# Patient Record
Sex: FEMALE | Race: WHITE | Employment: OTHER | URBAN - METROPOLITAN AREA
[De-identification: names, ages, dates, MRNs, and addresses within clinical notes are randomized per-mention and may not be internally consistent; named-entity substitution may affect disease eponyms.]

---

## 2022-12-05 ENCOUNTER — APPOINTMENT (OUTPATIENT)
Dept: GENERAL RADIOLOGY | Age: 87
DRG: 572 | End: 2022-12-05
Payer: MEDICARE

## 2022-12-05 PROCEDURE — 99285 EMERGENCY DEPT VISIT HI MDM: CPT

## 2022-12-05 PROCEDURE — 73110 X-RAY EXAM OF WRIST: CPT

## 2022-12-06 ENCOUNTER — ANESTHESIA EVENT (OUTPATIENT)
Dept: OPERATING ROOM | Age: 87
End: 2022-12-06
Payer: MEDICARE

## 2022-12-06 ENCOUNTER — HOSPITAL ENCOUNTER (INPATIENT)
Age: 87
LOS: 5 days | Discharge: HOME HEALTH | DRG: 572 | End: 2022-12-11
Attending: EMERGENCY MEDICINE | Admitting: INTERNAL MEDICINE
Payer: MEDICARE

## 2022-12-06 ENCOUNTER — ANESTHESIA (OUTPATIENT)
Dept: OPERATING ROOM | Age: 87
End: 2022-12-06
Payer: MEDICARE

## 2022-12-06 DIAGNOSIS — L02.419 ABSCESS OF WRIST: ICD-10-CM

## 2022-12-06 DIAGNOSIS — L02.91 ABSCESS: Primary | ICD-10-CM

## 2022-12-06 LAB
ALBUMIN SERPL-MCNC: 4 G/DL (ref 3.5–5.2)
ALP BLD-CCNC: 125 U/L (ref 35–104)
ALT SERPL-CCNC: 10 U/L (ref 0–32)
ANION GAP SERPL CALCULATED.3IONS-SCNC: 8 MMOL/L (ref 7–16)
ANTISTREPTOLYSIN-O: 32 IU/ML (ref 0–200)
AST SERPL-CCNC: 17 U/L (ref 0–31)
BASOPHILS ABSOLUTE: 0.06 E9/L (ref 0–0.2)
BASOPHILS RELATIVE PERCENT: 0.6 % (ref 0–2)
BILIRUB SERPL-MCNC: 0.4 MG/DL (ref 0–1.2)
BUN BLDV-MCNC: 18 MG/DL (ref 6–23)
C-REACTIVE PROTEIN: 0.3 MG/DL (ref 0–0.4)
CALCIUM SERPL-MCNC: 9.3 MG/DL (ref 8.6–10.2)
CHLORIDE BLD-SCNC: 101 MMOL/L (ref 98–107)
CO2: 29 MMOL/L (ref 22–29)
CREAT SERPL-MCNC: 0.8 MG/DL (ref 0.5–1)
EOSINOPHILS ABSOLUTE: 0.24 E9/L (ref 0.05–0.5)
EOSINOPHILS RELATIVE PERCENT: 2.6 % (ref 0–6)
GFR SERPL CREATININE-BSD FRML MDRD: >60 ML/MIN/1.73
GLUCOSE BLD-MCNC: 106 MG/DL (ref 74–99)
HCT VFR BLD CALC: 48.4 % (ref 34–48)
HEMOGLOBIN: 15.4 G/DL (ref 11.5–15.5)
IMMATURE GRANULOCYTES #: 0.02 E9/L
IMMATURE GRANULOCYTES %: 0.2 % (ref 0–5)
LACTIC ACID, SEPSIS: 1.2 MMOL/L (ref 0.5–1.9)
LACTIC ACID, SEPSIS: 1.5 MMOL/L (ref 0.5–1.9)
LYMPHOCYTES ABSOLUTE: 4.47 E9/L (ref 1.5–4)
LYMPHOCYTES RELATIVE PERCENT: 47.9 % (ref 20–42)
MCH RBC QN AUTO: 28.7 PG (ref 26–35)
MCHC RBC AUTO-ENTMCNC: 31.8 % (ref 32–34.5)
MCV RBC AUTO: 90.3 FL (ref 80–99.9)
MONOCYTES ABSOLUTE: 0.67 E9/L (ref 0.1–0.95)
MONOCYTES RELATIVE PERCENT: 7.2 % (ref 2–12)
NEUTROPHILS ABSOLUTE: 3.87 E9/L (ref 1.8–7.3)
NEUTROPHILS RELATIVE PERCENT: 41.5 % (ref 43–80)
PDW BLD-RTO: 14 FL (ref 11.5–15)
PLATELET # BLD: 246 E9/L (ref 130–450)
PMV BLD AUTO: 10.9 FL (ref 7–12)
POTASSIUM REFLEX MAGNESIUM: 4.1 MMOL/L (ref 3.5–5)
RBC # BLD: 5.36 E12/L (ref 3.5–5.5)
SEDIMENTATION RATE, ERYTHROCYTE: 2 MM/HR (ref 0–20)
SODIUM BLD-SCNC: 138 MMOL/L (ref 132–146)
TOTAL PROTEIN: 6.9 G/DL (ref 6.4–8.3)
WBC # BLD: 9.3 E9/L (ref 4.5–11.5)

## 2022-12-06 PROCEDURE — 83605 ASSAY OF LACTIC ACID: CPT

## 2022-12-06 PROCEDURE — 87075 CULTR BACTERIA EXCEPT BLOOD: CPT

## 2022-12-06 PROCEDURE — 2709999900 HC NON-CHARGEABLE SUPPLY: Performed by: ORTHOPAEDIC SURGERY

## 2022-12-06 PROCEDURE — 0JBG0ZZ EXCISION OF RIGHT LOWER ARM SUBCUTANEOUS TISSUE AND FASCIA, OPEN APPROACH: ICD-10-PCS | Performed by: ORTHOPAEDIC SURGERY

## 2022-12-06 PROCEDURE — 87070 CULTURE OTHR SPECIMN AEROBIC: CPT

## 2022-12-06 PROCEDURE — 7100000001 HC PACU RECOVERY - ADDTL 15 MIN: Performed by: ORTHOPAEDIC SURGERY

## 2022-12-06 PROCEDURE — 3700000000 HC ANESTHESIA ATTENDED CARE: Performed by: ORTHOPAEDIC SURGERY

## 2022-12-06 PROCEDURE — 86140 C-REACTIVE PROTEIN: CPT

## 2022-12-06 PROCEDURE — 3600000012 HC SURGERY LEVEL 2 ADDTL 15MIN: Performed by: ORTHOPAEDIC SURGERY

## 2022-12-06 PROCEDURE — 2580000003 HC RX 258: Performed by: ORTHOPAEDIC SURGERY

## 2022-12-06 PROCEDURE — 87205 SMEAR GRAM STAIN: CPT

## 2022-12-06 PROCEDURE — 2580000003 HC RX 258: Performed by: NURSE ANESTHETIST, CERTIFIED REGISTERED

## 2022-12-06 PROCEDURE — 6370000000 HC RX 637 (ALT 250 FOR IP): Performed by: ORTHOPAEDIC SURGERY

## 2022-12-06 PROCEDURE — 2580000003 HC RX 258: Performed by: SPECIALIST

## 2022-12-06 PROCEDURE — 1200000000 HC SEMI PRIVATE

## 2022-12-06 PROCEDURE — 3600000002 HC SURGERY LEVEL 2 BASE: Performed by: ORTHOPAEDIC SURGERY

## 2022-12-06 PROCEDURE — 6360000002 HC RX W HCPCS: Performed by: NURSE ANESTHETIST, CERTIFIED REGISTERED

## 2022-12-06 PROCEDURE — 2500000003 HC RX 250 WO HCPCS: Performed by: NURSE ANESTHETIST, CERTIFIED REGISTERED

## 2022-12-06 PROCEDURE — 3700000001 HC ADD 15 MINUTES (ANESTHESIA): Performed by: ORTHOPAEDIC SURGERY

## 2022-12-06 PROCEDURE — 85025 COMPLETE CBC W/AUTO DIFF WBC: CPT

## 2022-12-06 PROCEDURE — 80053 COMPREHEN METABOLIC PANEL: CPT

## 2022-12-06 PROCEDURE — 88304 TISSUE EXAM BY PATHOLOGIST: CPT

## 2022-12-06 PROCEDURE — 7100000000 HC PACU RECOVERY - FIRST 15 MIN: Performed by: ORTHOPAEDIC SURGERY

## 2022-12-06 PROCEDURE — 6360000002 HC RX W HCPCS: Performed by: SPECIALIST

## 2022-12-06 PROCEDURE — 86060 ANTISTREPTOLYSIN O TITER: CPT

## 2022-12-06 PROCEDURE — 87040 BLOOD CULTURE FOR BACTERIA: CPT

## 2022-12-06 PROCEDURE — 36415 COLL VENOUS BLD VENIPUNCTURE: CPT

## 2022-12-06 PROCEDURE — 99222 1ST HOSP IP/OBS MODERATE 55: CPT | Performed by: INTERNAL MEDICINE

## 2022-12-06 PROCEDURE — 85651 RBC SED RATE NONAUTOMATED: CPT

## 2022-12-06 RX ORDER — SODIUM CHLORIDE 9 MG/ML
INJECTION, SOLUTION INTRAVENOUS CONTINUOUS PRN
Status: DISCONTINUED | OUTPATIENT
Start: 2022-12-06 | End: 2022-12-06 | Stop reason: SDUPTHER

## 2022-12-06 RX ORDER — ONDANSETRON 2 MG/ML
INJECTION INTRAMUSCULAR; INTRAVENOUS PRN
Status: DISCONTINUED | OUTPATIENT
Start: 2022-12-06 | End: 2022-12-06 | Stop reason: SDUPTHER

## 2022-12-06 RX ORDER — SODIUM CHLORIDE 9 MG/ML
INJECTION, SOLUTION INTRAVENOUS PRN
Status: DISCONTINUED | OUTPATIENT
Start: 2022-12-06 | End: 2022-12-11 | Stop reason: HOSPADM

## 2022-12-06 RX ORDER — WATER 1000 ML/1000ML
INJECTION, SOLUTION INTRAVENOUS
Status: DISPENSED
Start: 2022-12-06 | End: 2022-12-07

## 2022-12-06 RX ORDER — HYDROCODONE BITARTRATE AND ACETAMINOPHEN 7.5; 325 MG/1; MG/1
1 TABLET ORAL EVERY 4 HOURS PRN
Status: DISCONTINUED | OUTPATIENT
Start: 2022-12-06 | End: 2022-12-08 | Stop reason: ALTCHOICE

## 2022-12-06 RX ORDER — FENTANYL CITRATE 50 UG/ML
INJECTION, SOLUTION INTRAMUSCULAR; INTRAVENOUS PRN
Status: DISCONTINUED | OUTPATIENT
Start: 2022-12-06 | End: 2022-12-06 | Stop reason: SDUPTHER

## 2022-12-06 RX ORDER — LIDOCAINE HYDROCHLORIDE 20 MG/ML
INJECTION, SOLUTION EPIDURAL; INFILTRATION; INTRACAUDAL; PERINEURAL PRN
Status: DISCONTINUED | OUTPATIENT
Start: 2022-12-06 | End: 2022-12-06 | Stop reason: SDUPTHER

## 2022-12-06 RX ORDER — LEVOTHYROXINE SODIUM 0.05 MG/1
50 TABLET ORAL DAILY
COMMUNITY

## 2022-12-06 RX ORDER — PROPOFOL 10 MG/ML
INJECTION, EMULSION INTRAVENOUS PRN
Status: DISCONTINUED | OUTPATIENT
Start: 2022-12-06 | End: 2022-12-06 | Stop reason: SDUPTHER

## 2022-12-06 RX ORDER — FENTANYL CITRATE 50 UG/ML
25 INJECTION, SOLUTION INTRAMUSCULAR; INTRAVENOUS EVERY 5 MIN PRN
Status: DISCONTINUED | OUTPATIENT
Start: 2022-12-06 | End: 2022-12-06 | Stop reason: HOSPADM

## 2022-12-06 RX ORDER — SODIUM CHLORIDE 0.9 % (FLUSH) 0.9 %
5-40 SYRINGE (ML) INJECTION PRN
Status: DISCONTINUED | OUTPATIENT
Start: 2022-12-06 | End: 2022-12-06 | Stop reason: HOSPADM

## 2022-12-06 RX ORDER — ACETAMINOPHEN 325 MG/1
650 TABLET ORAL EVERY 6 HOURS
Status: DISCONTINUED | OUTPATIENT
Start: 2022-12-06 | End: 2022-12-11 | Stop reason: HOSPADM

## 2022-12-06 RX ORDER — SODIUM CHLORIDE 0.9 % (FLUSH) 0.9 %
5-40 SYRINGE (ML) INJECTION EVERY 12 HOURS SCHEDULED
Status: DISCONTINUED | OUTPATIENT
Start: 2022-12-06 | End: 2022-12-11 | Stop reason: HOSPADM

## 2022-12-06 RX ORDER — EPHEDRINE SULFATE/0.9% NACL/PF 50 MG/5 ML
SYRINGE (ML) INTRAVENOUS PRN
Status: DISCONTINUED | OUTPATIENT
Start: 2022-12-06 | End: 2022-12-06 | Stop reason: SDUPTHER

## 2022-12-06 RX ORDER — OXYCODONE HYDROCHLORIDE 5 MG/1
5 TABLET ORAL EVERY 4 HOURS PRN
Status: DISCONTINUED | OUTPATIENT
Start: 2022-12-06 | End: 2022-12-11 | Stop reason: HOSPADM

## 2022-12-06 RX ORDER — SODIUM CHLORIDE 9 MG/ML
INJECTION, SOLUTION INTRAVENOUS PRN
Status: DISCONTINUED | OUTPATIENT
Start: 2022-12-06 | End: 2022-12-06 | Stop reason: HOSPADM

## 2022-12-06 RX ORDER — SODIUM CHLORIDE 0.9 % (FLUSH) 0.9 %
5-40 SYRINGE (ML) INJECTION PRN
Status: DISCONTINUED | OUTPATIENT
Start: 2022-12-06 | End: 2022-12-11 | Stop reason: HOSPADM

## 2022-12-06 RX ORDER — SODIUM CHLORIDE 0.9 % (FLUSH) 0.9 %
5-40 SYRINGE (ML) INJECTION EVERY 12 HOURS SCHEDULED
Status: DISCONTINUED | OUTPATIENT
Start: 2022-12-06 | End: 2022-12-06 | Stop reason: HOSPADM

## 2022-12-06 RX ORDER — FENTANYL CITRATE 50 UG/ML
50 INJECTION, SOLUTION INTRAMUSCULAR; INTRAVENOUS EVERY 5 MIN PRN
Status: DISCONTINUED | OUTPATIENT
Start: 2022-12-06 | End: 2022-12-06 | Stop reason: HOSPADM

## 2022-12-06 RX ORDER — SODIUM CHLORIDE 9 MG/ML
INJECTION, SOLUTION INTRAVENOUS CONTINUOUS
Status: DISCONTINUED | OUTPATIENT
Start: 2022-12-06 | End: 2022-12-11 | Stop reason: HOSPADM

## 2022-12-06 RX ORDER — CEFAZOLIN 2 G/1
INJECTION, POWDER, FOR SOLUTION INTRAMUSCULAR; INTRAVENOUS
Status: DISPENSED
Start: 2022-12-06 | End: 2022-12-07

## 2022-12-06 RX ADMIN — LIDOCAINE HYDROCHLORIDE 100 MG: 20 INJECTION, SOLUTION EPIDURAL; INFILTRATION; INTRACAUDAL; PERINEURAL at 16:45

## 2022-12-06 RX ADMIN — FENTANYL CITRATE 50 MCG: 50 INJECTION, SOLUTION INTRAMUSCULAR; INTRAVENOUS at 16:45

## 2022-12-06 RX ADMIN — SODIUM CHLORIDE: 9 INJECTION, SOLUTION INTRAVENOUS at 16:20

## 2022-12-06 RX ADMIN — FENTANYL CITRATE 50 MCG: 50 INJECTION, SOLUTION INTRAMUSCULAR; INTRAVENOUS at 17:04

## 2022-12-06 RX ADMIN — OXYCODONE 5 MG: 5 TABLET ORAL at 18:35

## 2022-12-06 RX ADMIN — ACETAMINOPHEN 650 MG: 325 TABLET ORAL at 18:35

## 2022-12-06 RX ADMIN — WATER 2000 MG: 1 INJECTION INTRAMUSCULAR; INTRAVENOUS; SUBCUTANEOUS at 17:02

## 2022-12-06 RX ADMIN — SODIUM CHLORIDE: 9 INJECTION, SOLUTION INTRAVENOUS at 18:38

## 2022-12-06 RX ADMIN — PROPOFOL 100 MG: 10 INJECTION, EMULSION INTRAVENOUS at 16:45

## 2022-12-06 RX ADMIN — ONDANSETRON 4 MG: 2 INJECTION INTRAMUSCULAR; INTRAVENOUS at 16:55

## 2022-12-06 RX ADMIN — Medication 10 MG: at 16:58

## 2022-12-06 ASSESSMENT — ENCOUNTER SYMPTOMS
SHORTNESS OF BREATH: 0
VOMITING: 0
EYE REDNESS: 0
NAUSEA: 0
ABDOMINAL PAIN: 0

## 2022-12-06 ASSESSMENT — PAIN - FUNCTIONAL ASSESSMENT
PAIN_FUNCTIONAL_ASSESSMENT: NONE - DENIES PAIN
PAIN_FUNCTIONAL_ASSESSMENT: PREVENTS OR INTERFERES SOME ACTIVE ACTIVITIES AND ADLS

## 2022-12-06 ASSESSMENT — PAIN SCALES - GENERAL
PAINLEVEL_OUTOF10: 0
PAINLEVEL_OUTOF10: 8

## 2022-12-06 ASSESSMENT — PAIN DESCRIPTION - ORIENTATION
ORIENTATION: RIGHT
ORIENTATION: RIGHT

## 2022-12-06 ASSESSMENT — PAIN DESCRIPTION - LOCATION
LOCATION: HAND
LOCATION: WRIST

## 2022-12-06 ASSESSMENT — PAIN DESCRIPTION - DESCRIPTORS: DESCRIPTORS: ACHING;DISCOMFORT

## 2022-12-06 ASSESSMENT — PAIN DESCRIPTION - PAIN TYPE: TYPE: SURGICAL PAIN

## 2022-12-06 NOTE — PROGRESS NOTES
S/p right forearm I&D  Likely infected  Sent for cultures and pathology  Abx per ID  Follow up with ortho in 2 weeks  Stay in splint  Nwb right arm

## 2022-12-06 NOTE — CARE COORDINATION
Introduced my self and provided explanation of CM role to patient. Patient is awake, alert, and aware of current diagnosis and treatment plan including OR today for a right wrist abscess incision and drainage. ID is also on consult. She voices she resides at home alone and completes her adl's with independence. Patient is established with a pcp and denies any issue with retail pharmaceutical coverage. She has 2 sons locally. She indicates that if she is discharged on oral antibiotic therapy she would have no discharge planning needs. She is currently on no anti-infective therapy. Will follow along with  and assist with discharge planning as necessary. Aneesh Weller.  Aamir, MSN RN  [facility] Case Management  [phone]

## 2022-12-06 NOTE — H&P
[facility] History and Physical        Chief Complaint:  wrist pain  History of Present Illness   The patient is a 80 y.o. female    Pcp from [place] [address]? Hx of recurrent issues with R wrist/ganglion? Sent in by Dr. Monica Snellen yesterday seen in office --for surgery today. cyst present on the dorsum of her right wrist for the last 2 months. Over the last 2 weeks is became erythematous, moderately painful and is now draining. The pain had somewhat improved since has been draining. Then in ER/ortho- compression wrapping placed on wrist which has been helpful    She denies taking abx at home  She denies fevers ++ chills (only since been in ER, feels cold and wants blanket  No abx given in ER  Wbc N< LA N  Xray no osteo seen    - hx taken from the patient  REVIEW OF SYSTEMS:  no fevers, chills, cp, sob, n/v, ha, vision/hearing changes, wt changes, hot/cold flashes, other open skin lesions, diarrhea, constipation, dysuria/hematuria unless noted in HPI. Complete ROS performed with the patient and is otherwise negative. Past Medical History:  +Disease of thyroid gland   Negative for Asthma, lung disease, cancer, pulmonary embolism, diabetes, other cardiac disease, hepatic disease , allergies , renal disease, CHF  Past Surgical History:    Not relevant to admission    Home Medications:  Prior to Admission medications    Not on File       Allergies:  Keflex [cephalexin]    Social History:   TOBACCO:  never smoker  ETOH:   has no history on file for alcohol use. Family History:   No relevant family hx - applicable to this admission  Or deferred/otherwise considered non contributory to current admission  PHYSICAL EXAM:    VS: BP (!) 164/86   Pulse 56   Temp 97.5 °F (36.4 °C) (Oral)   Resp 16   Ht 5' 4\" (1.626 m)   Wt 122 lb 8 oz (55.6 kg)   SpO2 96%   BMI 21.03 kg/m²     General Appearance:     no acute distress. Psych:  HEENT:    A.O.  As per HPI details  NC/AT, PERRL, no pallor no icterus, lips/ext mucous membrane grossly N    Neck:   Supple, trachea midline, no obvious JVD   Resp:     Dec BS No wheezes, No rhonchi   Chest wall:    No tenderness or deformity   Heart:    Regular rate and rhythm, S1 and S2 normal, no rub or gallop. Abdomen:     Soft, non-tender, bowel sounds active    no suspicious obvious masses/organomegaly   Genitalia & Rectal:    Deferred.    Extremities x4:   Extremities normal, atraumatic, no cyanosis, no clubbing   Musculoskeletal:      NO active synovitis or swollen b/l wrists, 2-5 MCPs examined   Skin:     Per ER prior to compression dressing placed:  \"3 cm diameter area of fluctuance draining on the dorsum of the patient's right wrist\" no major surrounding erythema   Other wise Skin color, texture, turgor fairly normal,   Lymph nodes:   Cervical nodes grossly normal   Neurologic:  .Grossly symmetric  strength in UEs and LEs with symmetric grossly intact to light touch sensation     LABS:  CBC:   Lab Results   Component Value Date/Time    WBC 9.3 12/06/2022 02:13 AM    RBC 5.36 12/06/2022 02:13 AM    HGB 15.4 12/06/2022 02:13 AM    HCT 48.4 12/06/2022 02:13 AM     12/06/2022 02:13 AM    MCV 90.3 12/06/2022 02:13 AM     BMP:    Lab Results   Component Value Date/Time     12/06/2022 02:13 AM    K 4.1 12/06/2022 02:13 AM     12/06/2022 02:13 AM    CO2 29 12/06/2022 02:13 AM    BUN 18 12/06/2022 02:13 AM    CREATININE 0.8 12/06/2022 02:13 AM    GLUCOSE 106 12/06/2022 02:13 AM    CALCIUM 9.3 12/06/2022 02:13 AM     Hepatic Function Panel:    Lab Results   Component Value Date/Time    ALKPHOS 125 12/06/2022 02:13 AM    AST 17 12/06/2022 02:13 AM    ALT 10 12/06/2022 02:13 AM    PROT 6.9 12/06/2022 02:13 AM    LABALBU 4.0 12/06/2022 02:13 AM    BILITOT 0.4 12/06/2022 02:13 AM     Magnesium:  No results found for: MG    PT/INR:  No results found for: PROTIME, INR  U/A: No results found for: NITRITE, [address] [facility], [address] [facility], [address], [address] [place] [address], Kaya Resendez  ABG:  No results found for: PHART, IUJ8VTB, PO2ART, T7UBJUXG, TSV8WRE, BEART  TSH:  No results found for: TSH  Cardiac Enzymes: No results found for: CKTOTAL, CKMB, CKMBINDEX, TROPONINI    Radiology  No acute fracture or subluxation is identified. No definite evidence of   osseous destruction or periosteal reaction. 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 No focal soft tissue   abnormality.     :     Assessment & Plan   ACTIVE hospital problems being addressed/reassessed for this admission:  Principal Problem:    Abscess of wrist  Resolved Problems:    * No resolved hospital problems. *    Code status/DVT prophylaxis and PLAN --see orders   Note extensive time spent coordinating care between ER docs, ER and floor nurses, and transitioning care over to day providers  Plan of care/ clinical impressions/communication specifics detailed below:    80 y.o. female    Pcp from [facility]? Hx of recurrent issues with R wrist/ganglion? Sent in by Dr. Barnett First yesterday seen in office --for surgery today. cyst present on the dorsum of her right wrist for the last 2 months. Over the last 2 weeks is became erythematous, moderately painful and is now draining. The pain had somewhat improved since has been draining. Then in ER/ortho- compression wrapping placed on wrist which has been helpful    She denies taking abx at home  She denies fevers ++ chills (only since been in ER, feels cold and wants blanket  No abx given in ER  Wbc N< LA N  Xray no osteo seen  No acute fracture or subluxation is identified. No definite evidence of   osseous destruction or periosteal reaction. 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 No focal soft tissue   abnormality.          Tay Acuña MD   Night Officer, overnight admitting doctor at [facility] call day time doctor   for questions after 7:30am    Covering for [address]  Hospitalist Service  If Qs please call [phone]  Electronically signed by Rai Abad MD on 12/6/2022 at 4:37 AM  '

## 2022-12-06 NOTE — ANESTHESIA PRE PROCEDURE
Department of Anesthesiology  Preprocedure Note       Name:  Alek Smoker   Age:  80 y.o.  :  1929                                          MRN:  [id number]         Date:  2022      Surgeon: Enzo Lopez):  Chan Parisi MD    Procedure: Procedure(s):  I & D OF ABSCESS RIGHT WRIST    ++REQ TO FOLLOW++    Medications prior to admission:   Prior to Admission medications    Medication Sig Start Date End Date Taking? Authorizing Provider   levothyroxine (SYNTHROID) 50 MCG tablet Take 50 mcg by mouth Daily   Yes Historical Provider, MD       Current medications:    Current Facility-Administered Medications   Medication Dose Route Frequency Provider Last Rate Last Admin    HYDROcodone-acetaminophen (NORCO) 7.5-325 MG per tablet 1 tablet  1 tablet Oral Q4H PRN Chan Parisi MD        ceFAZolin (ANCEF) 2,000 mg in sterile water 20 mL IV syringe  2,000 mg IntraVENous Q12H Luca Auguste MD        ceFAZolin (ANCEF) 2 g injection             sterile water injection              Facility-Administered Medications Ordered in Other Encounters   Medication Dose Route Frequency Provider Last Rate Last Admin    0.9 % sodium chloride infusion   IntraVENous Continuous PRN Charlotte Lung, APRN - CRNA   New Bag at 22 1620       Allergies: Allergies   Allergen Reactions    Keflex [Cephalexin]        Problem List:    Patient Active Problem List   Diagnosis Code    Abscess of wrist L02.419       Past Medical History:  History reviewed. No pertinent past medical history. Past Surgical History:  No past surgical history on file.     Social History:    Social History     Tobacco Use    Smoking status: Never     Passive exposure: Never    Smokeless tobacco: Never   Substance Use Topics    Alcohol use: Not on file                                Counseling given: Not Answered      Vital Signs (Current):   Vitals:    22 0354 22 0415 22 0722 22 1603   BP: (!) 171/82 (!) 164/86 (!) 121/58 (!) 159/93   Pulse: 76 56 64 59   Resp: 18 16 16 (!) 8   Temp: 98.6 °F (37 °C) 97.5 °F (36.4 °C) 98 °F (36.7 °C) 98.3 °F (36.8 °C)   TempSrc:  Oral Oral    SpO2: 97% 96% 93% 95%   Weight:  122 lb 8 oz (55.6 kg)  122 lb (55.3 kg)   Height:  5' 4\" (1.626 m)  5' 4\" (1.626 m)                                              BP Readings from Last 3 Encounters:   12/06/22 (!) 159/93       NPO Status: Time of last liquid consumption: 2200                        Time of last solid consumption: 1700                        Date of last liquid consumption: 12/05/22                        Date of last solid food consumption: 12/05/22    BMI:   Wt Readings from Last 3 Encounters:   12/06/22 122 lb (55.3 kg)     Body mass index is 20.94 kg/m². CBC:   Lab Results   Component Value Date/Time    WBC 9.3 12/06/2022 02:13 AM    RBC 5.36 12/06/2022 02:13 AM    HGB 15.4 12/06/2022 02:13 AM    HCT 48.4 12/06/2022 02:13 AM    MCV 90.3 12/06/2022 02:13 AM    RDW 14.0 12/06/2022 02:13 AM     12/06/2022 02:13 AM       CMP:   Lab Results   Component Value Date/Time     12/06/2022 02:13 AM    K 4.1 12/06/2022 02:13 AM     12/06/2022 02:13 AM    CO2 29 12/06/2022 02:13 AM    BUN 18 12/06/2022 02:13 AM    CREATININE 0.8 12/06/2022 02:13 AM    LABGLOM >60 12/06/2022 02:13 AM    GLUCOSE 106 12/06/2022 02:13 AM    PROT 6.9 12/06/2022 02:13 AM    CALCIUM 9.3 12/06/2022 02:13 AM    BILITOT 0.4 12/06/2022 02:13 AM    ALKPHOS 125 12/06/2022 02:13 AM    AST 17 12/06/2022 02:13 AM    ALT 10 12/06/2022 02:13 AM       POC Tests: No results for input(s): POCGLU, POCNA, POCK, POCCL, POCBUN, POCHEMO, POCHCT in the last 72 hours.     Coags: No results found for: PROTIME, INR, APTT    HCG (If Applicable): No results found for: PREGTESTUR, PREGSERUM, HCG, HCGQUANT     ABGs: No results found for: PHART, PO2ART, KRL8SOA, OQC4IRF, BEART, I1ADYZAU     Type & Screen (If Applicable):  No results found for: LABABO, LABRH    Drug/Infectious Status (If Applicable):  No results found for: HIV, HEPCAB    COVID-19 Screening (If Applicable): No results found for: COVID19        Anesthesia Evaluation  Patient summary reviewed and Nursing notes reviewed no history of anesthetic complications:   Airway: Mallampati: II  TM distance: >3 FB   Neck ROM: full  Mouth opening: > = 3 FB   Dental:          Pulmonary:Negative Pulmonary ROS and normal exam                               Cardiovascular:Negative CV ROS  Exercise tolerance: good (>4 METS),                     Neuro/Psych:   Negative Neuro/Psych ROS              GI/Hepatic/Renal: Neg GI/Hepatic/Renal ROS            Endo/Other: Negative Endo/Other ROS                    Abdominal:             Vascular: negative vascular ROS. Other Findings:           Anesthesia Plan      general     ASA 1       Induction: intravenous. MIPS: Postoperative opioids intended and Prophylactic antiemetics administered. Anesthetic plan and risks discussed with patient and child/children. Use of blood products discussed with patient whom did not consent to blood products. Plan discussed with CRNA.                   Patient seen and evaluated Anh Bajwa MD   12/6/2022

## 2022-12-06 NOTE — FLOWSHEET NOTE
Pt presents to the ED secondary to swelling in the left wrist progressing over the past two weeks. PCP advised that the Pt has an,\"abcess\" and requires further evaluation at an ED. Pt currently is wresting comfortably with no complaints. Pt is speaking in full sentences showing no signs of distress. Pt denies any chest pain, SOB or dizziness.

## 2022-12-06 NOTE — ANESTHESIA POSTPROCEDURE EVALUATION
Department of Anesthesiology  Postprocedure Note    Patient: Joselin Werner  MRN: [id number]  YOB: 1929  Date of evaluation: 12/6/2022      Procedure Summary     Date: 12/06/22 Room / Location: [facility] 01 / [facility]    Anesthesia Start: 6882 Anesthesia Stop:     Procedure: I & D OF ABSCESS RIGHT WRIST (Right: Wrist) Diagnosis:       Abscess of wrist      (Abscess of wrist [U41.294])    Surgeons: Zach Tovar MD Responsible Provider: Zane Vargas MD    Anesthesia Type: general ASA Status: 1          Anesthesia Type: No value filed.     Geetha Phase I: Geetha Score: 10    Geetha Phase II:        Anesthesia Post Evaluation    Patient location during evaluation: PACU  Patient participation: complete - patient participated  Level of consciousness: awake and awake and alert  Pain score: 2  Airway patency: patent  Nausea & Vomiting: no nausea and no vomiting  Complications: no  Cardiovascular status: hemodynamically stable  Respiratory status: acceptable and spontaneous ventilation  Hydration status: euvolemic

## 2022-12-06 NOTE — PATIENT CARE CONFERENCE
P Quality Flow/Interdisciplinary Rounds Progress Note        Quality Flow Rounds held on December 6, 2022    Disciplines Attending:  Bedside Nurse, , , and Nursing Unit Ean Deluna was admitted on 12/6/2022  1:20 AM    Anticipated Discharge Date:       Disposition:    Jamar Score:  Jamar Scale Score: 22    Readmission Risk              Risk of Unplanned Readmission:  6           Discussed patient goal for the day, patient clinical progression, and barriers to discharge.   The following Goal(s) of the Day/Commitment(s) have been identified:  Diagnostics - Report Results OR today      Kendy Anderson RN  December 6, 2022

## 2022-12-06 NOTE — CONSULTS
[address] [place] [address] [facility]  Consultation Note     Admit Date: 12/6/2022  1:20 AM    Reason for Consult:   Right wrist abscess    Attending Physician:  Parvin Alejo DO    HISTORY OF PRESENT ILLNESS:             The history is obtained from extensive review of available past medical records. The patient is a 80 y.o. female who is not known to the ID service. The patient presented to the ED at [facility] on 12/5/2022 complaining of right wrist pain. She had a cyst on the dorsum of the right wrist for a couple of months. Few days ago he began draining some bloody fluid. He was seen by orthopedic surgery in the office and was sent to the ED for an admission. She denies having any systemic signs of infection. She will be going to the OR for debridement of the cyst.    Past Medical History:    History reviewed. No pertinent past medical history. Past Surgical History:    No past surgical history on file. Current Medications:   Scheduled Meds:  Continuous Infusions:  PRN Meds:HYDROcodone-acetaminophen    Allergies:  Keflex [cephalexin]    Social History:   Social History     Socioeconomic History    Marital status:      Spouse name: None    Number of children: None    Years of education: None    Highest education level: None   Tobacco Use    Smoking status: Never     Passive exposure: Never    Smokeless tobacco: Never      Pets: No  Travel: No  The patient lives alone    Family History:   No family history on file. . Otherwise non-pertinent to the chief complaint. REVIEW OF SYSTEMS:    Constitutional: Negative for fevers, chills, diaphoresis  Neurologic: Negative   Psychiatric: Negative  Rheumatologic: Negative   Endocrine: Negative  Hematologic: Negative  Immunologic: Negative  ENT: Negative  Respiratory: Negative   Cardiovascular: Negative  GI: Negative  : Negative  Musculoskeletal: As in the HPI  Skin: No rashes.      PHYSICAL EXAM:    Vitals:   BP (!) 121/58   Pulse 64 TRICHOMONAS, YEAST, BACTERIA, CLARITYU, SPECGRAV, LEUKOCYTESUR, UROBILINOGEN, BILIRUBINUR, BLOODU, GLUCOSEU, AMORPHOUS    No results found for: HCO3, BE, O2SAT, PH, THGB, PCO2, PO2, TCO2  Radiology:  Reviewed    Microbiology:  Pending  No results for input(s): BC in the last 72 hours. No results for input(s): ORG in the last 72 hours. No results for input(s): Barnetta Archie in the last 72 hours. No results for input(s): STREPNEUMAGU in the last 72 hours. No results for input(s): LP1UAG in the last 72 hours. No results for input(s): ASO in the last 72 hours. No results for input(s): CULTRESP in the last 72 hours. No results for input(s): PROCAL in the last 72 hours. Assessment:  Possibly infected versus hemorrhagic right wrist cyst    Plan:    Patient going to the OR tonight  Check cultures, baseline ESR, CRP  Start Cefazolin after debridement  Will follow with you    Thank you for having us see this patient in consultation. I will be discussing this case with the treating physicians. Spoke with nursing.     Eric Garcia MD  12:53 PM  12/6/2022

## 2022-12-06 NOTE — ED PROVIDER NOTES
Chief complaint wrist pain and abscess      HPI:  12/6/22, Time: 1:45 AM EST    KARAN Mackay is a 80 y.o. female presenting to the ED for wrist pain and abscess. History is obtained from patient as well as the patient's medical record. Patient states that she did have a cyst present on the dorsum of her right wrist for the last 2 months. Over the last 2 weeks is became erythematous, painful and is now draining. The pain had somewhat improved since has been draining. She was sent in by Dr. Shante Hernandez for surgery tomorrow. There are no associated fevers, chills, lightheadedness, nasal congestion, chest pain, shortness of breath, cough, nausea, vomiting, abdominal pain, diarrhea, constipation, dysuria or hematuria. The patient has no other stated complaints at this time. ROS:   Review of Systems   Constitutional:  Negative for chills and fatigue. HENT:  Negative for congestion. Eyes:  Negative for redness. Respiratory:  Negative for shortness of breath. Cardiovascular:  Negative for chest pain. Gastrointestinal:  Negative for abdominal pain, nausea and vomiting. Genitourinary:  Negative for dysuria. Musculoskeletal:  Positive for arthralgias and joint swelling (right wrist swelling). Skin:  Negative for rash. Neurological:  Negative for light-headedness. Psychiatric/Behavioral:  Negative for confusion. All other systems reviewed and are negative.    --------------------------------------------- PAST HISTORY ---------------------------------------------  Past Medical History:  has no past medical history on file. Past Surgical History:  has no past surgical history on file. Social History:      Family History: family history is not on file. The patients home medications have been reviewed.     Allergies: Keflex [cephalexin]    ---------------------------------------------------PHYSICAL EXAM--------------------------------------      Constitutional/General: Alert and oriented x3, well appearing, non toxic in NAD  Head: Normocephalic and atraumatic  Mouth: Oropharynx clear, handling secretions, no trismus  Neck: Supple, full ROM,  Pulmonary: Lungs clear to auscultation bilaterally, no wheezes, rales, or rhonchi. Not in respiratory distress  Cardiovascular:  Regular rate. Regular rhythm. No murmurs  Chest: no chest wall tenderness  Abdomen: Soft. Non tender. Non distended. No rebound, guarding, or rigidity. No pulsatile masses appreciated. Musculoskeletal: Moves all extremities x 4. There is a 3 cm diameter area of fluctuance draining on the dorsum of the patient's right wrist, 2+ radial pulse present  Skin: warm and dry. No rashes. Neurologic: GCS 15, no gross focal neurologic deficits  Psych: Normal Affect    -------------------------------------------------- RESULTS -------------------------------------------------  I have personally reviewed all laboratory and imaging results for this patient. Results are listed below.      LABS:  Results for orders placed or performed during the hospital encounter of 12/06/22   CBC with Auto Differential   Result Value Ref Range    WBC 9.3 4.5 - 11.5 E9/L    RBC 5.36 3.50 - 5.50 E12/L    Hemoglobin 15.4 11.5 - 15.5 g/dL    Hematocrit 48.4 (H) 34.0 - 48.0 %    MCV 90.3 80.0 - 99.9 fL    MCH 28.7 26.0 - 35.0 pg    MCHC 31.8 (L) 32.0 - 34.5 %    RDW 14.0 11.5 - 15.0 fL    Platelets 384 762 - 447 E9/L    MPV 10.9 7.0 - 12.0 fL    Neutrophils % 41.5 (L) 43.0 - 80.0 %    Immature Granulocytes % 0.2 0.0 - 5.0 %    Lymphocytes % 47.9 (H) 20.0 - 42.0 %    Monocytes % 7.2 2.0 - 12.0 %    Eosinophils % 2.6 0.0 - 6.0 %    Basophils % 0.6 0.0 - 2.0 %    Neutrophils Absolute 3.87 1.80 - 7.30 E9/L    Immature Granulocytes # 0.02 E9/L    Lymphocytes Absolute 4.47 (H) 1.50 - 4.00 E9/L    Monocytes Absolute 0.67 0.10 - 0.95 E9/L    Eosinophils Absolute 0.24 0.05 - 0.50 E9/L    Basophils Absolute 0.06 0.00 - 0.20 E9/L   Comprehensive Metabolic Panel w/ Reflex to MG   Result Value Ref Range    Sodium 138 132 - 146 mmol/L    Potassium reflex Magnesium 4.1 3.5 - 5.0 mmol/L    Chloride 101 98 - 107 mmol/L    CO2 29 22 - 29 mmol/L    Anion Gap 8 7 - 16 mmol/L    Glucose 106 (H) 74 - 99 mg/dL    BUN 18 6 - 23 mg/dL    Creatinine 0.8 0.5 - 1.0 mg/dL    Est, Glom Filt Rate >60 >=60 mL/min/1.73    Calcium 9.3 8.6 - 10.2 mg/dL    Total Protein 6.9 6.4 - 8.3 g/dL    Albumin 4.0 3.5 - 5.2 g/dL    Total Bilirubin 0.4 0.0 - 1.2 mg/dL    Alkaline Phosphatase 125 (H) 35 - 104 U/L    ALT 10 0 - 32 U/L    AST 17 0 - 31 U/L   Lactate, Sepsis   Result Value Ref Range    Lactic Acid, Sepsis 1.2 0.5 - 1.9 mmol/L       RADIOLOGY:  Interpreted by Radiologist.  XR WRIST RIGHT (MIN 3 VIEWS)   Final Result   No acute osseous abnormality.               ------------------------- NURSING NOTES AND VITALS REVIEWED ---------------------------   The nursing notes within the ED encounter and vital signs as below have been reviewed by myself. BP (!) 164/86   Pulse 56   Temp 97.5 °F (36.4 °C) (Oral)   Resp 16   Ht 5' 4\" (1.626 m)   Wt 122 lb 8 oz (55.6 kg)   SpO2 96%   BMI 21.03 kg/m²   Oxygen Saturation Interpretation: Normal    The patients available past medical records and past encounters were reviewed. ------------------------------ ED COURSE/MEDICAL DECISION MAKING----------------------  Medications - No data to display          Medical Decision Making:   I, Dr. Devonte Suarez am the primary physician of record. Elsie Pendleton is a 80 y.o. female who presents to the ED for abscess. The patient did have labs which were reviewed. Patient with no systemic signs of sepsis. Will defer antibiotics to infectious disease. Patient will be admitted. Re-Evaluations/Consultations:             ED Course as of 12/06/22 0427   Mon Dec 05, 2022   2117 Spoke with Dr. Kaila Akbar who requested patient be admitted and ID consulted.  [BB]   Tue Dec 06, 2022   0304 Spoke with Dr. Farrukh Harding he will accept the patient for admission [MT]      ED Course User Index  [BB] Yue Vieira DO  [MT] Mathew Tamez DO               This patient's ED course included: History, physical examination, reevaluation prior to disposition,  labs, imaging  This patient has remained hemodynamically stable during their ED course. Counseling: The emergency provider has spoken with the patient and discussed todays results, in addition to providing specific details for the plan of care and counseling regarding the diagnosis and prognosis. Questions are answered at this time and they are agreeable with the plan.       --------------------------------- IMPRESSION AND DISPOSITION ---------------------------------    IMPRESSION  1. Abscess        DISPOSITION  Disposition: Admit to med/surg floor  Patient condition is stable        NOTE: This report was transcribed using voice recognition software.  Every effort was made to ensure accuracy; however, inadvertent computerized transcription errors may be present          Mathew Tamez DO  12/06/22 0366

## 2022-12-06 NOTE — CONSULTS
Chief Complaint       Jada Milan, a 80year old female, is seen today for a right wrist cyst for a duration of  1 year. Patient rates her pain as 4/10 and is stinging. Cyst is scabbed over and reports drainage. takes no pain meds. Jada Milan was not seen by another provider for this condition. She had no previous treatments. Jada Milan was referred by Areli Hahn MD.      History of Present Illness       Jada Milan is a 70-year-old right-hand-dominant female who presents today complaining of right wrist pain and a mass beginning 1 year ago. She states that she had a small lump on the dorsal aspect of her right wrist and forearm. She states that this began without injury. She states that it has since continued to increase in size. She has also noticed worsening discoloration of the mass. She has recently noticed clear drainage from the mass that has since turned to bloody drainage. She denies prior injury to this area. The patient's symptoms are worse with gripping and pinching. The patient's symptoms are worse with activities, and they are better at rest.  The pain is described as aching. The patient is feeling worse. She has not had previous treatment for this. She has not been on antibiotics. She denies fever, chills, nausea and vomiting. She is not taking medication for pain control. She denies significant past medical history. Past Medical History - reviewed  Arthritis    Surgical History - reviewed  No pertinent surgical history    Social History - reviewed  Hand dominance: right  Occupation: retired    Risk Factors - reviewed  Patient had a flu shot in the last 12 months? yes  The patient is 72 years or older and has had a pneumonia vaccine: yes  Patient has an implant hearing aid  Tobacco status: former smoker  Does patient exercise? yes  How often does patient exercise?  1 1/2 miles/day walking  In the past 12 months, have you fallen? no        Current Medications (including meds started today): levothyroxine 25 mcg tablet (levothyroxine)       Current Allergies (reviewed this update):  * KEFLEX (Critical)        Vital Signs   Weight: 120.00 lbs. (54.43 kg.)  Height: 64 in.    (162.56 cm.)  Pulse Rate: 76  Blood Pressure: 152/88 mm Hg  Body Mass Index: 20.60  Body Surface Area: 1.57 m2      Review of Systems   Neurologic: Positive for tremors. The remainder of the complete review of systems was negative. Physical Exam   General Appearance:   Awake and alert  Well developed, well nourished    Eyes:   Eyes appear normal.  Extraocular movements intact. Sclerae clear. Head:   Head normocephalic, atraumatic    Neck:   Neck soft and supple    Gait and Station:   Gait and station: no difficulty ambulating and able to stand without assist    Respiratory:   Respiratory effort: non-labored  No respiratory distress    Cardiovascular:   No apparent abnormalities. Lymphatic/Skin:   Skin pink, dry, and intact. Psychiatric:   Mood and affect: Normal                  Right Upper Extremity Exam:     Bruising No  Laceration No  Erythema No  Swelling noted about the  Dorsal right wrist  Tenderness noted about the  Dorsal right wrist  Sensation grossly intact Yes  Motor grossly intact Yes  Mass noted Yes  Mass size 3 x 3 cm  Mass location Dorsal right wrist  Mass is moble Yes  Mass is compressible Yes  Flexor digitorum profundus intact? Range of motion of the wrist moved freely and without pain? Yes  Range of motion of the elbow moved freely and without pain? Yes  Range of motion of the shoulder moved freely and without pain?  Yes  Comments   small amount of serosanguinous drainage from mass             Left Upper Extremity Exam:   Bruising: No  Laceration: No  Erythema: No  Warm and well perfused Yes  Sensation and motor grossly intact: Yes  Painless Active ROM   Fingers: Yes  Wrist: Yes  Elbow: Yes  Shoulder: Yes      Testing:   X-ray being read: S - Radiologic examination Wrist; 2 views [23747]  X-Ray Findings: X-rays 2 views of the right wrist were taken today. These demonstrate no acute fractures or dislocation. There is obvious soft tissue swelling along the dorsal right wrist and forearm. Impression   1. Cutaneous abscess of wrist, right: New    Plan of Care:   Carlos Rothman was educated today about the diagnosis mentioned above. Treatment options include observation, aspiration, further workup, and surgery. At this time, the patient will continue with pain medication as needed. She will present to the emergency room for likely admission with plan for surgical intervention. This will be a right wrist abscess incision and drainage. Infectious disease will be consulted. Preoperative, intraoperative, and postoperative protocols and expectations were discussed, and the patient was given the opportunity to ask questions. Nonoperative treatment options were discussed, as well. Potential complications of surgery were discussed with the patient. These include continued pain and / or numbness, new numbness, bleeding, stiffness, scar sensitivity, infection, damage to nerves / blood vessels / tendons / ligaments / muscles, need for additional surgery, loss of limb, blood clots, stroke, heart attack, problems related to anesthesia, and pneumonia. The patient wishes to proceed with surgery after this discussion of potential complications. She will follow-up postoperatively. Of note, greater than 50 minutes were spent on the floor and with the patient performing a history and physical, reviewing labs and imaging, and discussing plan. Half the time was spent counseling and coordinating care.

## 2022-12-06 NOTE — DISCHARGE INSTRUCTIONS
Pain medication is norco.    Antibiotic is per ID. Weightbearing status is limited to right arm. Do NOT take off splint. Keep splint clean and dry. Do not tub soak wound for 1 month (pool, bath). Follow up with Dr. Serenity Brewster in 2 weeks.     ===================================================================================================  [address] [place] [address] [facility]  ([address])  [address], [address] [place] [address]  Phone [phone]   Fax [phone]    Yonathan Cornelius. Lance Salgado MD, MD Taylor Rivera MD Karie Meadow, MD Devaughn Jewels, MD Dallis Millet, MD Irina Quiles, CNS   Octavia Dunn APRN, CNS  Boris Temple, APRN-CNP    SUNDAR Lock, CNS  Tabatha Gonzales, APRN-CNP   Sharlene Wilcox MD               STANDING ORDERS (ID Protocol)     Visiting nurses are to write the Primary Care Physician and their own call back number on all laboratory requisition forms. Abnormal lab values are called to the physician by the nurse and NOT by the laboratory. Fax all labs to the office in a timely manner, during office hours. All faxes should include nurses name and call back number. Vascular Access Devices or VADs (TLC, PICC, Midline, etc) will be replaced as necessary. Draw all blood work from VADs, except for drug levels. If unable to access a VAD, insert a peripheral catheter temporarily. Contact the Primary Care Physician or [facility] office for surgical referral.  Use tPA (Ahmed Maria Alejandra) as per agency protocol to restore patency of VAD. Saline flush 10ml or heparin flush 10U/cc IV daily and as needed to maintain line patency. Remove VAD upon completion of IV antibiotics, unless otherwise specified by the ordering physician.   If VAD cannot be removed, schedule appointment at office for removal.  If VAD was placed by Radiology, schedule appointment for removal.  Notify ordering physician or office if patient requires admission to the hospital with reason for admission. Discontinue all blood work upon completion of IV antibiotics, unless otherwise specified by ordering physician. Notify ordering physician if the patient does not receive the scheduled antibiotic for 24 hours or more. The Pharmacy and [address] [place] [address] may adjust the timing of the infusion and blood work to accommodate the patients home care conditions. When PICC or VAD is to be removed, documentation of length of inserted PICC. PICC or VAD length is to correlate with inserted length and sent to physician at the time of removal.  Give the patient a list of antibiotics being administered with:  Drug name  Route  Frequency  Start/Stop date      ROUTINE LABS TO BE DRAWN/ORDERED:  Twice weekly (preferably every Monday & Thursday):  CMP  Complete Blood Count with differential (CBC with dif)  Once weekly:  C-Reactive Protein (not high sensitivity CRP)  Erythrocyte/Westergren Sedimentation Rate (WSR or ESR)  Total CPK for patients on Daptomycin (Cubicin®). Obtain CPK more often if the patient is experiencing muscle weakness or myalgias. Clinical Pharmacist is to adjust Vancomycin and Aminoglycosides. Clinical pharmacist is  encouraged to follow: \"Therapeutic Monitoring of Vancomycin for Serious Methicillin-resistant Staphylococcus aureus Infections: A Revised Consensus Guideline and Review by the American Society of Health-system Pharmacists, the Infectious Diseases Society of [address], the Pediatric Infectious Diseases Society, and the Society of Infectious Diseases Pharmacists\" (https://doi.org/10.1093/ishaan/xnct047). If a clinical calculator is not available, clinical pharmacist is to follow the orders below:  Draw Vancomycin trough 30 minutes before the third dose  After starting drug, or   After the dosing or interval is changed. If the trough level is between 5 and 20 continue dose as ordered.   Draw troughs twice weekly thereafter until twice every 5 minutes if needed. Call EMS and notify office or physician immediately. For all above reactions: administer Solu-Cortef 100mg slow IV push x 1. VASCULAR ACCESS DRESSING CHANGE PROTOCOL  Cleanse insertion site with ChlorPrep® or equivalent three times. Secure catheter with Steri-Strips®, Bone®, or equivalent securing device. Apply Opsite® 3000 or equivalent transparent dressing. Change dressing twice weekly, maintaining sterile technique. If there is a BioPatch®, SilverSite® or equivalent, change once weekly only or as needed. FOLLOW-UP VISITS  Nursing staff should call the office during business hours to schedule a follow-up appointment once the patient is admitted to the service or facility. Every effort should be made to have patient follow-up within 2 weeks of discharge. Exception is made for ventilator-dependent patients. Continue IV antibiotic therapy until patient is seen in the office or unless specific stop date is noted on the original order or unless otherwise ordered by physician. Call office to ensure stop date is correct before stopping antibiotics.         Molly Landeros MD

## 2022-12-07 PROBLEM — R73.9 HYPERGLYCEMIA: Status: ACTIVE | Noted: 2022-12-07

## 2022-12-07 PROBLEM — R03.0 ELEVATED BP WITHOUT DIAGNOSIS OF HYPERTENSION: Status: ACTIVE | Noted: 2022-12-07

## 2022-12-07 PROBLEM — L02.91 ABSCESS: Status: ACTIVE | Noted: 2022-12-07

## 2022-12-07 PROBLEM — E03.9 HYPOTHYROIDISM: Status: ACTIVE | Noted: 2022-12-07

## 2022-12-07 LAB
ANION GAP SERPL CALCULATED.3IONS-SCNC: 8 MMOL/L (ref 7–16)
BUN BLDV-MCNC: 21 MG/DL (ref 6–23)
CALCIUM SERPL-MCNC: 8.4 MG/DL (ref 8.6–10.2)
CHLORIDE BLD-SCNC: 108 MMOL/L (ref 98–107)
CO2: 25 MMOL/L (ref 22–29)
CREAT SERPL-MCNC: 0.9 MG/DL (ref 0.5–1)
GFR SERPL CREATININE-BSD FRML MDRD: 59 ML/MIN/1.73
GLUCOSE BLD-MCNC: 93 MG/DL (ref 74–99)
GRAM STAIN ORDERABLE: NORMAL
GRAM STAIN ORDERABLE: NORMAL
HCT VFR BLD CALC: 42.1 % (ref 34–48)
HEMOGLOBIN: 13.5 G/DL (ref 11.5–15.5)
INR BLD: 1.1
MCH RBC QN AUTO: 29.7 PG (ref 26–35)
MCHC RBC AUTO-ENTMCNC: 32.1 % (ref 32–34.5)
MCV RBC AUTO: 92.7 FL (ref 80–99.9)
PDW BLD-RTO: 14.4 FL (ref 11.5–15)
PLATELET # BLD: 212 E9/L (ref 130–450)
PMV BLD AUTO: 10.6 FL (ref 7–12)
POTASSIUM REFLEX MAGNESIUM: 4.3 MMOL/L (ref 3.5–5)
PROTHROMBIN TIME: 11.9 SEC (ref 9.3–12.4)
RBC # BLD: 4.54 E12/L (ref 3.5–5.5)
SODIUM BLD-SCNC: 141 MMOL/L (ref 132–146)
WBC # BLD: 9.3 E9/L (ref 4.5–11.5)

## 2022-12-07 PROCEDURE — 6360000002 HC RX W HCPCS: Performed by: ORTHOPAEDIC SURGERY

## 2022-12-07 PROCEDURE — 85610 PROTHROMBIN TIME: CPT

## 2022-12-07 PROCEDURE — 2580000003 HC RX 258: Performed by: ORTHOPAEDIC SURGERY

## 2022-12-07 PROCEDURE — 36415 COLL VENOUS BLD VENIPUNCTURE: CPT

## 2022-12-07 PROCEDURE — 97161 PT EVAL LOW COMPLEX 20 MIN: CPT

## 2022-12-07 PROCEDURE — 80048 BASIC METABOLIC PNL TOTAL CA: CPT

## 2022-12-07 PROCEDURE — 1200000000 HC SEMI PRIVATE

## 2022-12-07 PROCEDURE — 99232 SBSQ HOSP IP/OBS MODERATE 35: CPT | Performed by: INTERNAL MEDICINE

## 2022-12-07 PROCEDURE — 85027 COMPLETE CBC AUTOMATED: CPT

## 2022-12-07 RX ADMIN — SODIUM CHLORIDE: 9 INJECTION, SOLUTION INTRAVENOUS at 22:50

## 2022-12-07 RX ADMIN — WATER 2000 MG: 1 INJECTION INTRAMUSCULAR; INTRAVENOUS; SUBCUTANEOUS at 04:49

## 2022-12-07 RX ADMIN — WATER 2000 MG: 1 INJECTION INTRAMUSCULAR; INTRAVENOUS; SUBCUTANEOUS at 17:19

## 2022-12-07 RX ADMIN — SODIUM CHLORIDE: 9 INJECTION, SOLUTION INTRAVENOUS at 09:39

## 2022-12-07 ASSESSMENT — PAIN SCALES - GENERAL: PAINLEVEL_OUTOF10: 0

## 2022-12-07 NOTE — PROGRESS NOTES
[facility] Hospitalist   Progress Note    Admitting Date and Time: 12/6/2022  1:20 AM  Admit Dx: Abscess of wrist [L02.419]    Subjective:    Patient was admitted with Abscess of wrist [L02.419].  Patient denies fever, chills, cp, sob, n/v.     ceFAZolin  2,000 mg IntraVENous Q12H    sodium chloride flush  5-40 mL IntraVENous 2 times per day    acetaminophen  650 mg Oral Q6H     HYDROcodone-acetaminophen, 1 tablet, Q4H PRN  sodium chloride flush, 5-40 mL, PRN  sodium chloride, , PRN  oxyCODONE, 5 mg, Q4H PRN  HYDROmorphone, 0.25 mg, Q3H PRN   Or  HYDROmorphone, 0.5 mg, Q3H PRN         Objective:    BP (!) 128/59   Pulse 66   Temp 98.5 °F (36.9 °C) (Oral)   Resp 16   Ht 5' 4\" (1.626 m)   Wt 125 lb (56.7 kg)   SpO2 93%   BMI 21.46 kg/m²   Skin: warm and dry, no rash or erythema  Pulmonary/Chest: clear to auscultation bilaterally- no wheezes, rales or rhonchi, normal air movement, no respiratory distress  Cardiovascular: rhythm reg at rate of 64  Abdomen: soft, non-tender, non-distended, normal bowel sounds, no masses or organomegaly  Extremities: no cyanosis, no clubbing, and no edema      Recent Labs     12/06/22  0213 12/07/22  0314    141   K 4.1 4.3    108*   CO2 29 25   BUN 18 21   CREATININE 0.8 0.9   GLUCOSE 106* 93   CALCIUM 9.3 8.4*       Recent Labs     12/06/22  0213 12/07/22  0314   WBC 9.3 9.3   RBC 5.36 4.54   HGB 15.4 13.5   HCT 48.4* 42.1   MCV 90.3 92.7   MCH 28.7 29.7   MCHC 31.8* 32.1   RDW 14.0 14.4    212   MPV 10.9 10.6       CBC:   Lab Results   Component Value Date/Time    WBC 9.3 12/07/2022 03:14 AM    RBC 4.54 12/07/2022 03:14 AM    HGB 13.5 12/07/2022 03:14 AM    HCT 42.1 12/07/2022 03:14 AM    MCV 92.7 12/07/2022 03:14 AM    MCH 29.7 12/07/2022 03:14 AM    MCHC 32.1 12/07/2022 03:14 AM    RDW 14.4 12/07/2022 03:14 AM     12/07/2022 03:14 AM    MPV 10.6 12/07/2022 03:14 AM     BMP:    Lab Results   Component Value Date/Time

## 2022-12-07 NOTE — PATIENT CARE CONFERENCE
P Quality Flow/Interdisciplinary Rounds Progress Note        Quality Flow Rounds held on December 7, 2022    Disciplines Attending:  Bedside Nurse, , , and Nursing Unit Ean Deluna was admitted on 12/6/2022  1:20 AM    Anticipated Discharge Date:       Disposition:    Jamar Score:  Jamar Scale Score: 22    Readmission Risk              Risk of Unplanned Readmission:  7           Discussed patient goal for the day, patient clinical progression, and barriers to discharge.   The following Goal(s) of the Day/Commitment(s) have been identified:  Labs - Report Results      Mark Macias RN  December 7, 2022

## 2022-12-07 NOTE — PROGRESS NOTES
Physical Therapy  Facility/Department: [facility] SURG  Physical Therapy Initial Assessment    Name: Jairo Ogden  : 1929  MRN: [id number]  Date of Service: 2022    Patient Diagnosis(es): The primary encounter diagnosis was Abscess. A diagnosis of Abscess of wrist was also pertinent to this visit. Past Medical History:  has no past medical history on file. Past Surgical History:  has a past surgical history that includes Hand surgery (Right, 2022). Evaluating Therapist: [facility] PT      Room #:  3762/2363-M  Diagnosis:  Abscess of wrist [Y69.653]  Procedure/Surgery:  R forearm I&D  Precautions:  NWB R UE      Social:  Pt lives alone in a 1 floor plan. Prior to admission independent without device. Walks a mile and a half a day. States her sons live nearby and can provide assistance if needed. Initial Evaluation  Date: 22   Was pt agreeable to Eval/treatment? yes   Does pt have pain? No c/o pain   Bed Mobility  Rolling: independent  Supine to sit: independent  Sit to supine: independent  Scooting: independent   Transfers Sit to stand: independent  Stand to sit: independent  Stand pivot: independent   Ambulation    200 feet with no device independent   Stair Negotiation  Ascended and descended  NT   LE strength     4/5   balance      good   AM-PAC Raw score               24/24       Pt is alert and Oriented   LE ROM: WFL  Sensation: intact  Edema: none  Endurance: good     ASSESSMENT:    Pt displays functional ability as noted in the objective portion of this evaluation.       Patient education  Pt educated on NWB R UE    Patient response to education:   Pt verbalized understanding Pt demonstrated skill Pt requires further education in this area   yes Occasional cues         Comments:  Pt educated on NWB R UE    PHYSICAL THERAPY PLAN OF CARE:    PT POC is established based on physician order and patient diagnosis     Referring provider/PT Order: Lul Isabel MD    Pt independent, no PT needs at this time    Time in  0915  Time out  0930      Evaluation Time includes thorough review of current medical information, gathering information on past medical history/social history and prior level of function, completion of standardized testing/informal observation of tasks, assessment of data and education on plan of care and goals.       CPT codes:  [x] Low Complexity PT evaluation 27976  [] Moderate Complexity PT evaluation 04073  [] High Complexity PT evaluation 61258  [] PT Re-evaluation 66993  [] Gait training 05219 minutes  [] Manual therapy 11930 minutes  [] Therapeutic activities 58583 minutes  [] Therapeutic exercises 64274 minutes  [] Neuromuscular reeducation 77513 minutes     [facility] PT [id number]

## 2022-12-07 NOTE — PROGRESS NOTES
[address] [place] [address] [facility]  Progress Note    SUBJECTIVE:  Chief Complaint   Patient presents with    Wrist Pain     Pain and swelling in right wrist.  Sent by [facility] for surgery tomorrow. Patient is tolerating medications. No reported adverse drug reactions. No nausea, vomiting, diarrhea. Denies pain in the right arm, good ROM of fingers  No fevers    Review of systems:  As stated above in the chief complaint, otherwise negative. Medications:  Scheduled Meds:   ceFAZolin  2,000 mg IntraVENous Q12H    sodium chloride flush  5-40 mL IntraVENous 2 times per day    acetaminophen  650 mg Oral Q6H     Continuous Infusions:   sodium chloride 75 mL/hr at 22 0939    sodium chloride       PRN Meds:HYDROcodone-acetaminophen, sodium chloride flush, sodium chloride, oxyCODONE, HYDROmorphone **OR** HYDROmorphone    OBJECTIVE:  BP (!) 141/66   Pulse 66   Temp 97.2 °F (36.2 °C) (Oral)   Resp 14   Ht 5' 4\" (1.626 m)   Wt 125 lb (56.7 kg)   SpO2 92%   BMI 21.46 kg/m²   Temp  Av °F (36.7 °C)  Min: 97.2 °F (36.2 °C)  Max: 98.6 °F (37 °C)  Constitutional: The patient is awake, alert, and oriented. In no distress. Eating lunch   Skin: Warm and dry. No rashes were noted. HEENT: Round and reactive pupils. Moist mucous membranes. No ulcerations or thrush. Neck: Supple to movements. Chest: No use of accessory muscles to breathe. Symmetrical expansion. No wheezing, crackles or rhonchi. Cardiovascular: S1 and S2 are rhythmic and regular. No murmurs appreciated. Abdomen: Positive bowel sounds to auscultation. Benign to palpation. No masses felt. Extremities: No edema. Right arm is dressed post op in a sling, good ROM of fingers.    Lines: peripheral    Laboratory and Tests Review:  Lab Results   Component Value Date    WBC 9.3 2022    WBC 9.3 2022    HGB 13.5 2022    HCT 42.1 2022    MCV 92.7 2022     2022     Lab Results   Component Value Date    NEUTROABS 3.87 12/06/2022     No results found for: CRPHS  Lab Results   Component Value Date    ALT 10 12/06/2022    AST 17 12/06/2022    ALKPHOS 125 (H) 12/06/2022    BILITOT 0.4 12/06/2022     Lab Results   Component Value Date/Time     12/07/2022 03:14 AM    K 4.3 12/07/2022 03:14 AM     12/07/2022 03:14 AM    CO2 25 12/07/2022 03:14 AM    BUN 21 12/07/2022 03:14 AM    CREATININE 0.9 12/07/2022 03:14 AM    CREATININE 0.8 12/06/2022 02:13 AM    LABGLOM 59 12/07/2022 03:14 AM    GLUCOSE 93 12/07/2022 03:14 AM    PROT 6.9 12/06/2022 02:13 AM    LABALBU 4.0 12/06/2022 02:13 AM    CALCIUM 8.4 12/07/2022 03:14 AM    BILITOT 0.4 12/06/2022 02:13 AM    ALKPHOS 125 12/06/2022 02:13 AM    AST 17 12/06/2022 02:13 AM    ALT 10 12/06/2022 02:13 AM     Lab Results   Component Value Date    CRP 0.3 12/06/2022     Lab Results   Component Value Date    SEDRATE 2 12/06/2022     Radiology:  Reviewed     Microbiology:   Blood cultures 12/6: negative so far  Wound culture 12/6: no organisms seen     ASSESSMENT:  Possibly infected versus hemorrhagic right wrist cyst  Status post I&D right forearm abscess 12/6/2022- purulence was noted     PLAN:  Continue Cefazolin for now  Check final & intraoperative cultures  Monitor labs - CRP 0.3, ESR 2, ASO 32    Cinthia Sun, APRN - CNP  11:30 AM  12/7/2022     Patient seen and examined. I had a face to face encounter with the patient. Agree with exam.  Assessment and plan as outlined above and directed by me. Addition and corrections were done as deemed appropriate. My exam and plan include: The patient underwent debridement. She was having a fair amount of pain last night. It has improved by this morning. She was hoping to go home today. Operative note describes purulent fluid. Tolerating Cefazolin. Check final cultures. No discharge until we have final ID and sensitivities of the organism.     Valentin Menard MD  12/7/2022  1:37 PM

## 2022-12-07 NOTE — PROGRESS NOTES
Orthopaedic Surgery Progress Note  Evita Kowalski MD    Date of Encounter: 12/7/22    Time of Encounter: 0700    Subjective: better right arm pain    Objective:  General - NAD, awake, alert  Extremity - right arm - in splint. Sensation and motor intact.     Assessment: 81 yo female pod 1 right forearm I&D    Plan:  Pain control  Limit WB right arm  Abx per ID  Stay in splint  Sling optional  Follow up with Ayanna in 2 weeks  Lisa Bella for discharge from ortho standpoint

## 2022-12-07 NOTE — OP NOTE
[address] [address]                                OPERATIVE REPORT    PATIENT NAME: Colby Red                      :        1929  MED REC NO:   [id number]                            ROOM:       2293  ACCOUNT NO:   [de-identified]                           ADMIT DATE: 2022  PROVIDER:     Troy Brown    DATE OF PROCEDURE:  2022    PREOPERATIVE DIAGNOSIS:  Right forearm abscess. POSTOPERATIVE DIAGNOSIS:  Right forearm abscess. PROCEDURE PERFORMED:  Right forearm abscess incision and drainage and  skin and subcutaneous tissue irrigation and debridement. SURGEON:  Dr. Troy Brown. ANTIBIOTICS:  Will be given Ancef on the floor after cultures are taken. COMPLICATIONS:  None. ANESTHESIA:  General.    TOTAL TOURNIQUET TIME:  See record. ESTIMATED BLOOD LOSS:  10 mL. SPECIMENS:  Right forearm abscess sent for cultures and pathology. INDICATIONS:  This is a 35-year-old female who was diagnosed with right  forearm abscess based on history and physical and imaging. After  discussing risks and benefits with the patient and family, they wished  to undergo procedure listed above. DESCRIPTION OF PROCEDURE:  The patient was brought to the operating room  on her cart. She was transferred to the operating room table. All the  extremities were well padded. Tourniquet was placed on the right  forearm. She underwent general anesthesia. She was prepped and draped  in the sterile fashion using Betadine. Time-out was performed. Skin  was incised with a knife longitudinally overlying the dorsal aspect of  the abscess. Subcutaneous tissues were dissected with tenotomy  scissors. There was purulent material noted. This was sent for aerobic  and anaerobic cultures as well as pathology. There was significant  scarring in the area, possible cyst wall formation, abscess formation. MD Crocker notified of patient's critical Hgb (5.9). MD ordered CBC to be drawn after infusion of one unit of blood.    This was excised from its surrounding subcutaneous tissue and excised  about the dorsal aspect of the extensor tendons. This was sent for  pathology and cultures. Next, debridement of skin and subcutaneous  tissue that were devitalized was performed. This was excisional  debridement performed with a knife and a rongeur. This was an area of  approximately 1 x 2 cm about the dorsal aspect of the wrist.  Next, the  wound underwent copious irrigation with saline. This was a gravity flow  saline. This was approximately 2 liters of saline. Healthy tissue was  noted. Tourniquet was deflated. Adequate hemostasis was obtained. Wound was then closed with 3-0 nylon. Wound was dressed with Xeroform,  4x4s, ABD, volar resting splint, and Coban. She was awoken and brought  to the PACU in good condition. POSTOPERATIVE PLAN:  The patient will be readmitted to Medicine. She  will be on IV antibiotics per Infectious Disease. She will be  nonweightbearing, right arm. She will stay in her splint. She will  follow up with Dr. Aquiles Lim in two weeks. Any questions, feel free to  call the office.         Mitchell Pallas    D: 12/06/2022 17:35:02       T: 12/07/2022 2:06:13     [email or web address]  Job#: [id number]     Doc#: [id number]    CC:

## 2022-12-08 LAB
ANION GAP SERPL CALCULATED.3IONS-SCNC: 7 MMOL/L (ref 7–16)
BUN BLDV-MCNC: 16 MG/DL (ref 6–23)
CALCIUM SERPL-MCNC: 8.6 MG/DL (ref 8.6–10.2)
CHLORIDE BLD-SCNC: 109 MMOL/L (ref 98–107)
CO2: 26 MMOL/L (ref 22–29)
CREAT SERPL-MCNC: 0.8 MG/DL (ref 0.5–1)
GFR SERPL CREATININE-BSD FRML MDRD: >60 ML/MIN/1.73
GLUCOSE BLD-MCNC: 91 MG/DL (ref 74–99)
HCT VFR BLD CALC: 41.6 % (ref 34–48)
HEMOGLOBIN: 13.1 G/DL (ref 11.5–15.5)
INR BLD: 1.1
MCH RBC QN AUTO: 28.4 PG (ref 26–35)
MCHC RBC AUTO-ENTMCNC: 31.5 % (ref 32–34.5)
MCV RBC AUTO: 90.2 FL (ref 80–99.9)
PDW BLD-RTO: 14.2 FL (ref 11.5–15)
PLATELET # BLD: 211 E9/L (ref 130–450)
PMV BLD AUTO: 11.3 FL (ref 7–12)
POTASSIUM REFLEX MAGNESIUM: 4.3 MMOL/L (ref 3.5–5)
PROTHROMBIN TIME: 12 SEC (ref 9.3–12.4)
RBC # BLD: 4.61 E12/L (ref 3.5–5.5)
SODIUM BLD-SCNC: 142 MMOL/L (ref 132–146)
WBC # BLD: 8.4 E9/L (ref 4.5–11.5)

## 2022-12-08 PROCEDURE — 6360000002 HC RX W HCPCS: Performed by: ORTHOPAEDIC SURGERY

## 2022-12-08 PROCEDURE — 2580000003 HC RX 258: Performed by: ORTHOPAEDIC SURGERY

## 2022-12-08 PROCEDURE — 85610 PROTHROMBIN TIME: CPT

## 2022-12-08 PROCEDURE — 99233 SBSQ HOSP IP/OBS HIGH 50: CPT | Performed by: INTERNAL MEDICINE

## 2022-12-08 PROCEDURE — 36415 COLL VENOUS BLD VENIPUNCTURE: CPT

## 2022-12-08 PROCEDURE — 80048 BASIC METABOLIC PNL TOTAL CA: CPT

## 2022-12-08 PROCEDURE — 1200000000 HC SEMI PRIVATE

## 2022-12-08 PROCEDURE — 85027 COMPLETE CBC AUTOMATED: CPT

## 2022-12-08 RX ORDER — LIDOCAINE HYDROCHLORIDE 10 MG/ML
5 INJECTION, SOLUTION EPIDURAL; INFILTRATION; INTRACAUDAL; PERINEURAL ONCE
Status: COMPLETED | OUTPATIENT
Start: 2022-12-08 | End: 2022-12-09

## 2022-12-08 RX ORDER — HEPARIN SODIUM (PORCINE) LOCK FLUSH IV SOLN 100 UNIT/ML 100 UNIT/ML
3 SOLUTION INTRAVENOUS EVERY 12 HOURS SCHEDULED
Status: DISCONTINUED | OUTPATIENT
Start: 2022-12-08 | End: 2022-12-11 | Stop reason: HOSPADM

## 2022-12-08 RX ORDER — SODIUM CHLORIDE 9 MG/ML
INJECTION, SOLUTION INTRAVENOUS PRN
Status: DISCONTINUED | OUTPATIENT
Start: 2022-12-08 | End: 2022-12-11 | Stop reason: HOSPADM

## 2022-12-08 RX ORDER — SODIUM CHLORIDE 0.9 % (FLUSH) 0.9 %
5-40 SYRINGE (ML) INJECTION PRN
Status: DISCONTINUED | OUTPATIENT
Start: 2022-12-08 | End: 2022-12-11 | Stop reason: HOSPADM

## 2022-12-08 RX ORDER — HEPARIN SODIUM (PORCINE) LOCK FLUSH IV SOLN 100 UNIT/ML 100 UNIT/ML
3 SOLUTION INTRAVENOUS PRN
Status: DISCONTINUED | OUTPATIENT
Start: 2022-12-08 | End: 2022-12-11 | Stop reason: HOSPADM

## 2022-12-08 RX ORDER — SODIUM CHLORIDE 0.9 % (FLUSH) 0.9 %
5-40 SYRINGE (ML) INJECTION EVERY 12 HOURS SCHEDULED
Status: DISCONTINUED | OUTPATIENT
Start: 2022-12-08 | End: 2022-12-11 | Stop reason: HOSPADM

## 2022-12-08 RX ADMIN — WATER 2000 MG: 1 INJECTION INTRAMUSCULAR; INTRAVENOUS; SUBCUTANEOUS at 04:58

## 2022-12-08 RX ADMIN — WATER 2000 MG: 1 INJECTION INTRAMUSCULAR; INTRAVENOUS; SUBCUTANEOUS at 17:47

## 2022-12-08 ASSESSMENT — PAIN SCALES - GENERAL: PAINLEVEL_OUTOF10: 0

## 2022-12-08 NOTE — PROGRESS NOTES
[facility] Progress Note    --------------------------------------------------------------------------------------  Assessment / Plan  R forearm abscess - s/p I&D on 12/6, on Ancef per ID pending cx.   Ok for dc from ortho PoV    Please see orders for further plan of care  Code status  Full Code  DVT prophylaxis SCDs  Disposition  Anticipate home when ready  --------------------------------------------------------------------------------------    Admission Date  12/6/2022  1:20 AM  Chief Complaint Wrist pain    Subjective  History of Present Illness  Seen at bedside  S/p OR on Tues for her wrist  Feels ok today  Pt very anxious to DC  Cx have not finalized, my recommendation is to wait for cx as per ID  No family present during my visit  No new issues identified today  Case was discussed with nursing    Review of Systems - 12-point review of systems has been reviewed and is otherwise negative except as listed in the HPI    Objective  Physical Exam  Vitals: BP (!) 179/82   Pulse 83   Temp 98.3 °F (36.8 °C) (Oral)   Resp 17   Ht 5' 4\" (1.626 m)   Wt 125 lb (56.7 kg)   SpO2 93%   BMI 21.46 kg/m²   General: well-developed, well-nourished, no acute distress, cooperative  Skin: generally warm, dry, and intact, with normal color  HEENT: normocephalic, atraumatic, no gross abnormalities  Respiratory: clear to auscultation bilaterally without respiratory distress  Cardiovascular: regular rate and rhythm without murmur / rub / gallop  Abdominal: soft, nontender, nondistended, normoactive bowel sounds  Extremities: no obvious edema or deformity, R wrist and most of forearm dressed  Neurologic: awake, alert, no gross deficits  Psychiatric: normal affect, cooperative    Electronically signed by Quintin Bay DO on 12/8/2022 at 12:54 PM

## 2022-12-08 NOTE — CARE COORDINATION
Chart reviewed, follow up visit had with patient. She acknowledges need for [facility] services and after choices provided selected [facility]. [facility]. Will await her review and response regarding availability/acceptance. The Plan for Transition of Care is related to the following treatment goals: [facility]    The Patient  was provided with a choice of provider and agrees   with the discharge plan. [x] Yes [] No    Freedom of choice list was provided with basic dialogue that supports the patient's individualized plan of care/goals, treatment preferences and shares the quality data associated with the providers. [x] Yes [] No  Clinton Bergman.  Aamir, MSN RN  [facility] Case Management  [phone] I will START or STAY ON the medications listed below when I get home from the hospital:    finasteride 5 mg oral tablet  -- 1 tab(s) by mouth once a day  -- Indication: For BPH (benign prostatic hyperplasia)    enalapril 5 mg oral tablet  -- 1 tab(s) by mouth 2 times a day  -- Indication: For Htn    tamsulosin 0.4 mg oral capsule  -- 1 cap(s) by mouth once a day  -- Indication: For BPH (benign prostatic hyperplasia)    Plavix 75 mg oral tablet  -- 1 tab(s) by mouth once a day  -- Indication: For Cad    metoprolol succinate 25 mg oral tablet, extended release  -- 1 tab(s) by mouth once a day   -- It is very important that you take or use this exactly as directed.  Do not skip doses or discontinue unless directed by your doctor.  May cause drowsiness.  Alcohol may intensify this effect.  Use care when operating dangerous machinery.  Some non-prescription drugs may aggravate your condition.  Read all labels carefully.  If a warning appears, check with your doctor before taking.  Swallow whole.  Do not crush.  Take with food or milk.  This drug may impair the ability to drive or operate machinery.  Use care until you become familiar with its effects.    -- Indication: For AF    folic acid 1 mg oral tablet  --  by mouth   -- Indication: For nutrition I will START or STAY ON the medications listed below when I get home from the hospital:    finasteride 5 mg oral tablet  -- 1 tab(s) by mouth once a day  -- Indication: For BPH (benign prostatic hyperplasia)    enalapril 5 mg oral tablet  -- 1 tab(s) by mouth 2 times a day  -- Indication: For Htn    tamsulosin 0.4 mg oral capsule  -- 1 cap(s) by mouth once a day  -- Indication: For BPH (benign prostatic hyperplasia)    Plavix 75 mg oral tablet  -- 1 tab(s) by mouth once a day  -- Indication: For Cad    metoprolol succinate 25 mg oral tablet, extended release  -- 1 tab(s) by mouth once a day   -- It is very important that you take or use this exactly as directed.  Do not skip doses or discontinue unless directed by your doctor.  May cause drowsiness.  Alcohol may intensify this effect.  Use care when operating dangerous machinery.  Some non-prescription drugs may aggravate your condition.  Read all labels carefully.  If a warning appears, check with your doctor before taking.  Swallow whole.  Do not crush.  Take with food or milk.  This drug may impair the ability to drive or operate machinery.  Use care until you become familiar with its effects.    -- Indication: For AF    Protonix 40 mg oral delayed release tablet  -- 1 tab(s) by mouth 2 times a day   -- It is very important that you take or use this exactly as directed.  Do not skip doses or discontinue unless directed by your doctor.  Obtain medical advice before taking any non-prescription drugs as some may affect the action of this medication.  Swallow whole.  Do not crush.    -- Indication: For gastritis    folic acid 1 mg oral tablet  --  by mouth   -- Indication: For nutrition

## 2022-12-08 NOTE — PATIENT CARE CONFERENCE
[facility] Quality Flow/Interdisciplinary Rounds Progress Note        Quality Flow Rounds held on December 8, 2022    Disciplines Attending:  Bedside Nurse, , , and Nursing Unit Ean Deluna was admitted on 12/6/2022  1:20 AM    Anticipated Discharge Date:       Disposition:    Jamar Score:  Jamar Scale Score: 22    Readmission Risk              Risk of Unplanned Readmission:  6           Discussed patient goal for the day, patient clinical progression, and barriers to discharge.   The following Goal(s) of the Day/Commitment(s) have been identified:  Diagnostics - Report Results and Labs - Report Results      Philippe Ivy RN  December 8, 2022 [Parents] : parents

## 2022-12-08 NOTE — PROGRESS NOTES
[address] [place] [address] [facility]  Progress Note    SUBJECTIVE:  Chief Complaint   Patient presents with    Wrist Pain     Pain and swelling in right wrist.  Sent by [facility] for surgery tomorrow. Patient is tolerating medications. No reported adverse drug reactions. No nausea, vomiting, diarrhea.   good ROM of fingers, slight soreness  No fevers  Anxious to get home. Has been doing exercises in bed  Review of systems:  As stated above in the chief complaint, otherwise negative. Medications:  Scheduled Meds:   ceFAZolin  2,000 mg IntraVENous Q12H    sodium chloride flush  5-40 mL IntraVENous 2 times per day    acetaminophen  650 mg Oral Q6H     Continuous Infusions:   sodium chloride 75 mL/hr at 22 2250    sodium chloride       PRN Meds:sodium chloride flush, sodium chloride, oxyCODONE, HYDROmorphone **OR** HYDROmorphone    OBJECTIVE:  BP (!) 179/82   Pulse 83   Temp 98.3 °F (36.8 °C) (Oral)   Resp 17   Ht 5' 4\" (1.626 m)   Wt 125 lb (56.7 kg)   SpO2 93%   BMI 21.46 kg/m²   Temp  Av.6 °F (37 °C)  Min: 98.3 °F (36.8 °C)  Max: 99 °F (37.2 °C)  Constitutional: The patient is awake, alert, and oriented. In no distress. Resting in bed  Skin: Warm and dry. No rashes were noted. HEENT: Round and reactive pupils. Moist mucous membranes. No ulcerations or thrush. Neck: Supple to movements. Chest: No use of accessory muscles to breathe. Symmetrical expansion. No wheezing, crackles or rhonchi. Cardiovascular: S1 and S2 are rhythmic and regular. No murmurs appreciated. Abdomen: Positive bowel sounds to auscultation. Benign to palpation. No masses felt. Extremities: No edema.  Right arm is dressed post op in a sling, good ROM of fingers, swelling present  Lines: peripheral    Laboratory and Tests Review:  Lab Results   Component Value Date    WBC 8.4 2022    WBC 9.3 2022    WBC 9.3 2022    HGB 13.1 2022    HCT 41.6 2022    MCV 90.2 2022  12/08/2022     Lab Results   Component Value Date    NEUTROABS 3.87 12/06/2022     No results found for: CRPHS  Lab Results   Component Value Date    ALT 10 12/06/2022    AST 17 12/06/2022    ALKPHOS 125 (H) 12/06/2022    BILITOT 0.4 12/06/2022     Lab Results   Component Value Date/Time     12/08/2022 02:57 AM    K 4.3 12/08/2022 02:57 AM     12/08/2022 02:57 AM    CO2 26 12/08/2022 02:57 AM    BUN 16 12/08/2022 02:57 AM    CREATININE 0.8 12/08/2022 02:57 AM    CREATININE 0.9 12/07/2022 03:14 AM    CREATININE 0.8 12/06/2022 02:13 AM    LABGLOM >60 12/08/2022 02:57 AM    GLUCOSE 91 12/08/2022 02:57 AM    PROT 6.9 12/06/2022 02:13 AM    LABALBU 4.0 12/06/2022 02:13 AM    CALCIUM 8.6 12/08/2022 02:57 AM    BILITOT 0.4 12/06/2022 02:13 AM    ALKPHOS 125 12/06/2022 02:13 AM    AST 17 12/06/2022 02:13 AM    ALT 10 12/06/2022 02:13 AM     Lab Results   Component Value Date    CRP 0.3 12/06/2022     Lab Results   Component Value Date    SEDRATE 2 12/06/2022     Radiology:  Reviewed     Microbiology:   Blood cultures 12/6: negative so far  Wound culture 12/6: no organisms seen     ASSESSMENT:  Possibly infected versus hemorrhagic right wrist cyst  Status post I&D right forearm abscess 12/6/2022- purulence was noted     PLAN:  Continue Cefazolin for now  Check final & intraoperative cultures  Monitor labs - CRP 0.3, ESR 2, ASO 32    SUNDAR Chung - CNP  9:27 AM  12/8/2022     Patient seen and examined. I had a face to face encounter with the patient. Agree with exam.  Assessment and plan as outlined above and directed by me. Addition and corrections were done as deemed appropriate. My exam and plan include: Patient is doing well. She wants to go home. She will need a PICC for IV antibiotics. Spoke with nursing.     Informed Consent for PICC:  I explained the risks, benefits, alternative options, and potential complications associated with insertion of Peripherally Inserted Central Catheter (PICC) with the patient prior to the procedure.     Electronically signed by Eric Garcia MD on 12/8/2022 at 1:32 PM     Eric Garcia MD  12/8/2022  1:31 PM

## 2022-12-08 NOTE — PROGRESS NOTES
Occupational Therapy    OT order received and chart reviewed. Pt observed to complete ADLs/functional mobility/transfers Mod I, demo'ing good balance/safety awareness. Pt aware to limit WB through RUE. Pt able to complete tasks mainly using 1 handed techniques. Pt reports no needed DME for home. Pt demonstrates no OT needs at this time. OT order discontinued. Please re-consult if there is a change in functional status.     Mikel Spatz, [address] [facility], OTR/L [id number]

## 2022-12-09 LAB
ANION GAP SERPL CALCULATED.3IONS-SCNC: 10 MMOL/L (ref 7–16)
BUN BLDV-MCNC: 16 MG/DL (ref 6–23)
CALCIUM SERPL-MCNC: 9 MG/DL (ref 8.6–10.2)
CHLORIDE BLD-SCNC: 107 MMOL/L (ref 98–107)
CO2: 25 MMOL/L (ref 22–29)
CREAT SERPL-MCNC: 0.7 MG/DL (ref 0.5–1)
CULTURE SURGICAL: NORMAL
GFR SERPL CREATININE-BSD FRML MDRD: >60 ML/MIN/1.73
GLUCOSE BLD-MCNC: 94 MG/DL (ref 74–99)
GRAM STAIN RESULT: NORMAL
HCT VFR BLD CALC: 45.2 % (ref 34–48)
HEMOGLOBIN: 14.9 G/DL (ref 11.5–15.5)
INR BLD: 1
MCH RBC QN AUTO: 29.6 PG (ref 26–35)
MCHC RBC AUTO-ENTMCNC: 33 % (ref 32–34.5)
MCV RBC AUTO: 89.9 FL (ref 80–99.9)
PDW BLD-RTO: 14.1 FL (ref 11.5–15)
PLATELET # BLD: 240 E9/L (ref 130–450)
PMV BLD AUTO: 10.6 FL (ref 7–12)
POTASSIUM REFLEX MAGNESIUM: 4.8 MMOL/L (ref 3.5–5)
PROTHROMBIN TIME: 11.8 SEC (ref 9.3–12.4)
RBC # BLD: 5.03 E12/L (ref 3.5–5.5)
SODIUM BLD-SCNC: 142 MMOL/L (ref 132–146)
WBC # BLD: 8.7 E9/L (ref 4.5–11.5)
WOUND/ABSCESS: NORMAL

## 2022-12-09 PROCEDURE — 6360000002 HC RX W HCPCS: Performed by: ORTHOPAEDIC SURGERY

## 2022-12-09 PROCEDURE — 2500000003 HC RX 250 WO HCPCS: Performed by: SPECIALIST

## 2022-12-09 PROCEDURE — 85027 COMPLETE CBC AUTOMATED: CPT

## 2022-12-09 PROCEDURE — 76937 US GUIDE VASCULAR ACCESS: CPT

## 2022-12-09 PROCEDURE — 6360000002 HC RX W HCPCS: Performed by: SPECIALIST

## 2022-12-09 PROCEDURE — 80048 BASIC METABOLIC PNL TOTAL CA: CPT

## 2022-12-09 PROCEDURE — 36415 COLL VENOUS BLD VENIPUNCTURE: CPT

## 2022-12-09 PROCEDURE — 36569 INSJ PICC 5 YR+ W/O IMAGING: CPT

## 2022-12-09 PROCEDURE — 2580000003 HC RX 258: Performed by: SPECIALIST

## 2022-12-09 PROCEDURE — 2580000003 HC RX 258: Performed by: ORTHOPAEDIC SURGERY

## 2022-12-09 PROCEDURE — 02HV33Z INSERTION OF INFUSION DEVICE INTO SUPERIOR VENA CAVA, PERCUTANEOUS APPROACH: ICD-10-PCS | Performed by: INTERNAL MEDICINE

## 2022-12-09 PROCEDURE — 1200000000 HC SEMI PRIVATE

## 2022-12-09 PROCEDURE — 99232 SBSQ HOSP IP/OBS MODERATE 35: CPT | Performed by: INTERNAL MEDICINE

## 2022-12-09 PROCEDURE — 85610 PROTHROMBIN TIME: CPT

## 2022-12-09 PROCEDURE — C1751 CATH, INF, PER/CENT/MIDLINE: HCPCS

## 2022-12-09 RX ADMIN — WATER 2000 MG: 1 INJECTION INTRAMUSCULAR; INTRAVENOUS; SUBCUTANEOUS at 10:32

## 2022-12-09 RX ADMIN — SODIUM CHLORIDE, PRESERVATIVE FREE 10 ML: 5 INJECTION INTRAVENOUS at 20:09

## 2022-12-09 RX ADMIN — Medication 300 UNITS: at 20:09

## 2022-12-09 RX ADMIN — LIDOCAINE HYDROCHLORIDE 1 ML: 10 SOLUTION INTRAVENOUS at 13:32

## 2022-12-09 RX ADMIN — WATER 2000 MG: 1 INJECTION INTRAMUSCULAR; INTRAVENOUS; SUBCUTANEOUS at 23:59

## 2022-12-09 ASSESSMENT — PAIN SCALES - GENERAL: PAINLEVEL_OUTOF10: 0

## 2022-12-09 NOTE — PROGRESS NOTES
[facility] Progress Note    --------------------------------------------------------------------------------------  Assessment / Plan  R forearm abscess - s/p I&D on 12/6, on Ancef per ID pending cx which do not appear to be showing any growth; either sample was inadequate or dealing w atypical organism. Ok for dc from ortho PoV.   Has PICC placed now but [facility] cannot see til Monday - plan to keep here til Sunday    Please see orders for further plan of care  Code status  Full Code  DVT prophylaxis SCDs  Disposition  Anticipate home when ready  --------------------------------------------------------------------------------------    Admission Date  12/6/2022  1:20 AM  Chief Complaint Wrist pain    Subjective  History of Present Illness  Seen at bedside  Now has PICC  [facility] cannot see til Monday so unfortunately cannot DC  She's not particularly thrilled w this development but understands  Actually very pleasant throughout our conversation  No family present during my visit  No new issues identified today  Case was discussed with nursing    Review of Systems - 12-point review of systems has been reviewed and is otherwise negative except as listed in the HPI    Objective  Physical Exam  Vitals: BP (!) 150/88   Pulse 65   Temp 98.6 °F (37 °C) (Oral)   Resp 16   Ht 5' 4\" (1.626 m)   Wt 128 lb (58.1 kg)   SpO2 97%   BMI 21.97 kg/m²   General: well-developed, well-nourished, no acute distress, cooperative  Skin: generally warm, dry, and intact, with normal color  HEENT: normocephalic, atraumatic, no gross abnormalities  Respiratory: clear to auscultation bilaterally without respiratory distress  Cardiovascular: regular rate and rhythm without murmur / rub / gallop  Abdominal: soft, nontender, nondistended, normoactive bowel sounds  Extremities: no obvious edema or deformity, R wrist and most of forearm dressed  Neurologic: awake, alert, no gross deficits  Psychiatric: normal affect, cooperative    Electronically signed by Rios Delgado DO on 12/9/2022 at 4:11 PM

## 2022-12-09 NOTE — CARE COORDINATION
Communication had with [facility] and [facility]. Neither agency can accept d/t staffing. Referrals have subsequently been sent to [facility] at [facility], [facility], [place] care, [place], [address]. Await response regarding availability and acceptance  Marquis Grey. CASSIE Rutledge RN  [facility] Case Management  [phone]    Responses received from above agencies none of which are able to accept referral.  Call was placed back to [facility] who can initiate care in the home 12/12/22. IV atb script has been faxed to Dilcia Smith with [facility]. If patient discharges on Sunday, please call [phone] to confirm with [facility]. HHC order is in place. Explained situation to patient who is disappointed but understanding. CASSIE Reid RN  [facility] Case Management  [phone]

## 2022-12-09 NOTE — PROCEDURES
PICC    Catheter insertion date: 12/9/2022     Product Number:  [address] [facility] [employer]   Lot No: [id number]   Gauge: 17   Lumen: single   L Basilic    Vein Diameter: 0.54cm   Arm circumference at insertion site: 25cm   Catheter Length: 43cm   Internal Length: 43cm   External Catheter Length: 0cm   Ultrasound Used: yes  [employer] [place] confirms PICC tip is placed in the lower 1/3 of the SVC or at the Cavoatrial junction. Floor nurse notified PICC is okay to use.    : Paco Chand RN [facility]

## 2022-12-09 NOTE — PROGRESS NOTES
[facility] Quality Flow/Interdisciplinary Rounds Progress Note        Quality Flow Rounds held on December 9, 2022    Disciplines Attending:  Bedside Nurse, , , and Nursing Unit Ean Deluna was admitted on 12/6/2022  1:20 AM    Anticipated Discharge Date:       Disposition:    Jamar Score:  Jamar Scale Score: 21    Readmission Risk              Risk of Unplanned Readmission:  7           Discussed patient goal for the day, patient clinical progression, and barriers to discharge.   The following Goal(s) of the Day/Commitment(s) have been identified:  Diagnostics - Report Results and Labs - Report Results, PICC line      Rod Navarro RN  December 9, 2022

## 2022-12-09 NOTE — PROGRESS NOTES
[address] [place] [address] [facility]  Progress Note    SUBJECTIVE:  Chief Complaint   Patient presents with    Wrist Pain     Pain and swelling in right wrist.  Sent by [facility] for surgery tomorrow. Patient is tolerating medications. No reported adverse drug reactions. No nausea, vomiting, diarrhea. Patient was refusing PICC but after talking with family and I she is agreeable to PICC and outpatient antibiotics   No fevers    Review of systems:  As stated above in the chief complaint, otherwise negative. Medications:  Scheduled Meds:   lidocaine PF  5 mL IntraDERmal Once    sodium chloride flush  5-40 mL IntraVENous 2 times per day    heparin flush  3 mL IntraVENous 2 times per day    ceFAZolin  2,000 mg IntraVENous Q12H    sodium chloride flush  5-40 mL IntraVENous 2 times per day    acetaminophen  650 mg Oral Q6H     Continuous Infusions:   sodium chloride      sodium chloride 75 mL/hr at 22 2250    sodium chloride       PRN Meds:sodium chloride flush, sodium chloride, heparin flush, sodium chloride flush, sodium chloride, oxyCODONE, HYDROmorphone **OR** HYDROmorphone    OBJECTIVE:  BP (!) 150/88   Pulse 65   Temp 98.6 °F (37 °C) (Oral)   Resp 16   Ht 5' 4\" (1.626 m)   Wt 128 lb (58.1 kg)   SpO2 97%   BMI 21.97 kg/m²   Temp  Av.6 °F (37 °C)  Min: 98.6 °F (37 °C)  Max: 98.6 °F (37 °C)  Constitutional: The patient is awake, alert, and oriented. In no distress. Sitting up in bed, in no distress. Skin: Warm and dry. No rashes were noted. HEENT: Round and reactive pupils. Moist mucous membranes. No ulcerations or thrush. Neck: Supple to movements. Chest: No use of accessory muscles to breathe. Symmetrical expansion. No wheezing, crackles or rhonchi. Cardiovascular: S1 and S2 are rhythmic and regular. No murmurs appreciated. Abdomen: Positive bowel sounds to auscultation. Benign to palpation. No masses felt. Extremities: No edema.  Right arm is dressed post op in a sling, good ROM of fingers, swelling present  Lines: peripheral    Laboratory and Tests Review:  Lab Results   Component Value Date    WBC 8.7 12/09/2022    WBC 8.4 12/08/2022    WBC 9.3 12/07/2022    HGB 14.9 12/09/2022    HCT 45.2 12/09/2022    MCV 89.9 12/09/2022     12/09/2022     Lab Results   Component Value Date    NEUTROABS 3.87 12/06/2022     No results found for: CRPHS  Lab Results   Component Value Date    ALT 10 12/06/2022    AST 17 12/06/2022    ALKPHOS 125 (H) 12/06/2022    BILITOT 0.4 12/06/2022     Lab Results   Component Value Date/Time     12/09/2022 03:16 AM    K 4.8 12/09/2022 03:16 AM     12/09/2022 03:16 AM    CO2 25 12/09/2022 03:16 AM    BUN 16 12/09/2022 03:16 AM    CREATININE 0.7 12/09/2022 03:16 AM    CREATININE 0.8 12/08/2022 02:57 AM    CREATININE 0.9 12/07/2022 03:14 AM    LABGLOM >60 12/09/2022 03:16 AM    GLUCOSE 94 12/09/2022 03:16 AM    PROT 6.9 12/06/2022 02:13 AM    LABALBU 4.0 12/06/2022 02:13 AM    CALCIUM 9.0 12/09/2022 03:16 AM    BILITOT 0.4 12/06/2022 02:13 AM    ALKPHOS 125 12/06/2022 02:13 AM    AST 17 12/06/2022 02:13 AM    ALT 10 12/06/2022 02:13 AM     Lab Results   Component Value Date    CRP 0.3 12/06/2022     Lab Results   Component Value Date    SEDRATE 2 12/06/2022     Radiology:  Reviewed     Microbiology:   Blood cultures 12/6: negative so far  Wound culture 12/6: no organisms seen     ASSESSMENT:  Possibly infected versus hemorrhagic right wrist cyst  Status post I&D right forearm abscess 12/6/2022- purulence was noted     PLAN:  Continue Cefazolin for now  Agreeable for PICC line   Check final & intraoperative cultures  Monitor labs - CRP 0.3, ESR 2, ASO 32    Nicole Suggs, APRN - CNP  11:19 AM  12/9/2022     Patient seen and examined. I had a face to face encounter with the patient. Agree with exam.  Assessment and plan as outlined above and directed by me. Addition and corrections were done as deemed appropriate.  My exam and plan include: The patient has a PICC. Cultures are negative so far. This makes it more difficult to decide on antibiotics upon discharge. She was not being on Cefazolin tentatively for now. We have asked microbiology to hold the cultures. We might be dealing with a nontuberculous mycobacteria or unusual organisms, such as nocardia.     Devante Low MD  12/9/2022  1:55 PM

## 2022-12-09 NOTE — PROGRESS NOTES
Patient IV removed by patient when in restroom. Attempt x2 to place new site. Call to clinical manager with no answer, awaiting call back. 0400- Call placed again to CM, states she will be up when she available, otherwise IV team will be in in the morning.     Electronically signed by Sabine Hernandez RN on 12/9/2022 at 3:20 AM

## 2022-12-09 NOTE — PROGRESS NOTES
Speaking with patient about PICC insertion, she is now hesitant about necessity for line. She wishes to speak with ID about it before getting line. Charge and floor nurse aware. Will be available later today if patient changes her mind.

## 2022-12-10 PROCEDURE — 6360000002 HC RX W HCPCS: Performed by: ORTHOPAEDIC SURGERY

## 2022-12-10 PROCEDURE — 2580000003 HC RX 258: Performed by: SPECIALIST

## 2022-12-10 PROCEDURE — 2580000003 HC RX 258: Performed by: ORTHOPAEDIC SURGERY

## 2022-12-10 PROCEDURE — 1200000000 HC SEMI PRIVATE

## 2022-12-10 PROCEDURE — 99232 SBSQ HOSP IP/OBS MODERATE 35: CPT | Performed by: INTERNAL MEDICINE

## 2022-12-10 PROCEDURE — 6360000002 HC RX W HCPCS: Performed by: SPECIALIST

## 2022-12-10 RX ORDER — CEFAZOLIN 2 G/1
2000 INJECTION, POWDER, FOR SOLUTION INTRAMUSCULAR; INTRAVENOUS EVERY 8 HOURS
Status: DISCONTINUED | OUTPATIENT
Start: 2022-12-10 | End: 2022-12-10 | Stop reason: CLARIF

## 2022-12-10 RX ORDER — WATER 1000 ML/1000ML
20 INJECTION, SOLUTION INTRAVENOUS EVERY 8 HOURS
Status: COMPLETED | OUTPATIENT
Start: 2022-12-10 | End: 2022-12-11

## 2022-12-10 RX ORDER — CEFAZOLIN 2 G/1
2000 INJECTION, POWDER, FOR SOLUTION INTRAMUSCULAR; INTRAVENOUS EVERY 8 HOURS
Status: DISCONTINUED | OUTPATIENT
Start: 2022-12-10 | End: 2022-12-11 | Stop reason: HOSPADM

## 2022-12-10 RX ADMIN — SODIUM CHLORIDE, PRESERVATIVE FREE 10 ML: 5 INJECTION INTRAVENOUS at 09:24

## 2022-12-10 RX ADMIN — CEFAZOLIN 2000 MG: 2 INJECTION, POWDER, FOR SOLUTION INTRAMUSCULAR; INTRAVENOUS at 23:07

## 2022-12-10 RX ADMIN — WATER 20 ML: 1 INJECTION INTRAMUSCULAR; INTRAVENOUS; SUBCUTANEOUS at 23:07

## 2022-12-10 RX ADMIN — Medication 300 UNITS: at 23:10

## 2022-12-10 RX ADMIN — Medication 10 ML: at 12:04

## 2022-12-10 RX ADMIN — WATER 2000 MG: 1 INJECTION INTRAMUSCULAR; INTRAVENOUS; SUBCUTANEOUS at 12:03

## 2022-12-10 RX ADMIN — SODIUM CHLORIDE, PRESERVATIVE FREE 10 ML: 5 INJECTION INTRAVENOUS at 21:25

## 2022-12-10 RX ADMIN — Medication 300 UNITS: at 09:25

## 2022-12-10 RX ADMIN — SODIUM CHLORIDE, PRESERVATIVE FREE 10 ML: 5 INJECTION INTRAVENOUS at 23:12

## 2022-12-10 NOTE — PROGRESS NOTES
[facility] Progress Note    --------------------------------------------------------------------------------------  Assessment / Plan  R forearm abscess - s/p I&D on 12/6, on Ancef per ID pending cx which do not appear to be showing any growth; either sample was inadequate or dealing w atypical organism. Ok for dc from ortho PoV.   Has PICC placed now but [facility] cannot see til Monday - plan to keep here til tomorrow    Please see orders for further plan of care  Code status  Full Code  DVT prophylaxis SCDs  Disposition  Anticipate home when ready  --------------------------------------------------------------------------------------    Admission Date  12/6/2022  1:20 AM  Chief Complaint Wrist pain    Subjective  History of Present Illness  Seen at bedside  No changes over past 24h  Now has PICC  [facility] cannot see til Monday so unfortunately cannot DC  She's not particularly thrilled w this development but understands  Actually very pleasant throughout our conversation  No family present during my visit  No new issues identified today  Case was discussed with nursing    Review of Systems - 12-point review of systems has been reviewed and is otherwise negative except as listed in the HPI    Objective  Physical Exam  Vitals: /67   Pulse 73   Temp 98 °F (36.7 °C) (Oral)   Resp 16   Ht 5' 4\" (1.626 m)   Wt 128 lb (58.1 kg)   SpO2 97%   BMI 21.97 kg/m²   General: well-developed, well-nourished, no acute distress, cooperative  Skin: generally warm, dry, and intact, with normal color  HEENT: normocephalic, atraumatic, no gross abnormalities  Respiratory: clear to auscultation bilaterally without respiratory distress  Cardiovascular: regular rate and rhythm without murmur / rub / gallop  Abdominal: soft, nontender, nondistended, normoactive bowel sounds  Extremities: no obvious edema or deformity, R wrist and most of forearm dressed  Neurologic: awake, alert, no gross deficits  Psychiatric: normal jennifer, cooperative    Electronically signed by Robi Mercado DO on 12/10/2022 at 3:06 PM

## 2022-12-10 NOTE — PROGRESS NOTES
[address] [place] [address] [facility]  Progress Note    SUBJECTIVE:  Chief Complaint   Patient presents with    Wrist Pain     Pain and swelling in right wrist.  Sent by [facility] for surgery tomorrow. Patient is tolerating medications. No reported adverse drug reactions. No nausea, vomiting, diarrhea. In bed, feeling well  She is just frustrated that she has to wait her until Monday for [facility]   No fevers    Review of systems:  As stated above in the chief complaint, otherwise negative. Medications:  Scheduled Meds:   sodium chloride flush  5-40 mL IntraVENous 2 times per day    heparin flush  3 mL IntraVENous 2 times per day    ceFAZolin  2,000 mg IntraVENous Q12H    sodium chloride flush  5-40 mL IntraVENous 2 times per day    acetaminophen  650 mg Oral Q6H     Continuous Infusions:   sodium chloride      sodium chloride 75 mL/hr at 22 2250    sodium chloride       PRN Meds:sodium chloride flush, sodium chloride, heparin flush, sodium chloride flush, sodium chloride, oxyCODONE, HYDROmorphone **OR** HYDROmorphone    OBJECTIVE:  /67   Pulse 73   Temp 98 °F (36.7 °C) (Oral)   Resp 16   Ht 5' 4\" (1.626 m)   Wt 128 lb (58.1 kg)   SpO2 97%   BMI 21.97 kg/m²   Temp  Av.2 °F (36.8 °C)  Min: 98 °F (36.7 °C)  Max: 98.4 °F (36.9 °C)  Constitutional: The patient is awake, alert, and oriented. In no distress. Sitting up in bed. Skin: Warm and dry. No rashes were noted. HEENT: Round and reactive pupils. Moist mucous membranes. No ulcerations or thrush. Neck: Supple to movements. Chest: No use of accessory muscles to breathe. Symmetrical expansion. No wheezing, crackles or rhonchi. Cardiovascular: S1 and S2 are rhythmic and regular. No murmurs appreciated. Abdomen: Positive bowel sounds to auscultation. Benign to palpation. No masses felt. Extremities: No edema. Right arm is in surgical dressing.    Lines: peripheral   L Basilic PICC 89/7/55 58NK     Laboratory and Tests Review:  Lab Results   Component Value Date    WBC 8.7 12/09/2022    WBC 8.4 12/08/2022    WBC 9.3 12/07/2022    HGB 14.9 12/09/2022    HCT 45.2 12/09/2022    MCV 89.9 12/09/2022     12/09/2022     Lab Results   Component Value Date    NEUTROABS 3.87 12/06/2022     No results found for: CRP  Lab Results   Component Value Date    ALT 10 12/06/2022    AST 17 12/06/2022    ALKPHOS 125 (H) 12/06/2022    BILITOT 0.4 12/06/2022     Lab Results   Component Value Date/Time     12/09/2022 03:16 AM    K 4.8 12/09/2022 03:16 AM     12/09/2022 03:16 AM    CO2 25 12/09/2022 03:16 AM    BUN 16 12/09/2022 03:16 AM    CREATININE 0.7 12/09/2022 03:16 AM    CREATININE 0.8 12/08/2022 02:57 AM    CREATININE 0.9 12/07/2022 03:14 AM    LABGLOM >60 12/09/2022 03:16 AM    GLUCOSE 94 12/09/2022 03:16 AM    PROT 6.9 12/06/2022 02:13 AM    LABALBU 4.0 12/06/2022 02:13 AM    CALCIUM 9.0 12/09/2022 03:16 AM    BILITOT 0.4 12/06/2022 02:13 AM    ALKPHOS 125 12/06/2022 02:13 AM    AST 17 12/06/2022 02:13 AM    ALT 10 12/06/2022 02:13 AM     Lab Results   Component Value Date    CRP 0.3 12/06/2022     Lab Results   Component Value Date    SEDRATE 2 12/06/2022     Radiology:  Reviewed     Microbiology:   Blood cultures 12/6: negative so far  Wound culture 12/6: no organisms seen   Surgical Cultures 12/6/22: negative so far    ASSESSMENT:  Possibly infected versus hemorrhagic right wrist cyst  Status post I&D right forearm abscess 12/6/2022- purulence was noted     PLAN:  Continue Cefazolin for now  PICC line placed   Check final & intraoperative cultures  Monitor labs - CRP 0.3, ESR 2, ASO 32  DC plan Home with [facility], they can't start until 12/12    SUNDAR Dial CNP  11:36 AM  12/10/2022     Chart reviewed. Discussed with NP.  Plan as above

## 2022-12-11 VITALS
RESPIRATION RATE: 16 BRPM | TEMPERATURE: 98 F | OXYGEN SATURATION: 95 % | HEIGHT: 64 IN | SYSTOLIC BLOOD PRESSURE: 140 MMHG | BODY MASS INDEX: 21.85 KG/M2 | HEART RATE: 67 BPM | DIASTOLIC BLOOD PRESSURE: 72 MMHG | WEIGHT: 128 LBS

## 2022-12-11 LAB
BLOOD CULTURE, ROUTINE: NORMAL
CULTURE, BLOOD 2: NORMAL

## 2022-12-11 PROCEDURE — 6360000002 HC RX W HCPCS: Performed by: SPECIALIST

## 2022-12-11 PROCEDURE — 6360000002 HC RX W HCPCS: Performed by: ORTHOPAEDIC SURGERY

## 2022-12-11 PROCEDURE — 99239 HOSP IP/OBS DSCHRG MGMT >30: CPT | Performed by: INTERNAL MEDICINE

## 2022-12-11 PROCEDURE — 2580000003 HC RX 258: Performed by: SPECIALIST

## 2022-12-11 PROCEDURE — 2580000003 HC RX 258

## 2022-12-11 PROCEDURE — 6360000002 HC RX W HCPCS

## 2022-12-11 PROCEDURE — 2580000003 HC RX 258: Performed by: ORTHOPAEDIC SURGERY

## 2022-12-11 RX ORDER — WATER 1000 ML/1000ML
INJECTION, SOLUTION INTRAVENOUS
Status: DISCONTINUED
Start: 2022-12-11 | End: 2022-12-11 | Stop reason: HOSPADM

## 2022-12-11 RX ADMIN — CEFAZOLIN 2000 MG: 2 INJECTION, POWDER, FOR SOLUTION INTRAMUSCULAR; INTRAVENOUS at 06:57

## 2022-12-11 RX ADMIN — WATER 20 ML: 1 INJECTION INTRAMUSCULAR; INTRAVENOUS; SUBCUTANEOUS at 06:57

## 2022-12-11 RX ADMIN — Medication 300 UNITS: at 16:23

## 2022-12-11 RX ADMIN — SODIUM CHLORIDE, PRESERVATIVE FREE 10 ML: 5 INJECTION INTRAVENOUS at 16:23

## 2022-12-11 RX ADMIN — Medication 300 UNITS: at 09:59

## 2022-12-11 RX ADMIN — SODIUM CHLORIDE, PRESERVATIVE FREE 10 ML: 5 INJECTION INTRAVENOUS at 15:23

## 2022-12-11 RX ADMIN — CEFAZOLIN 2000 MG: 2 INJECTION, POWDER, FOR SOLUTION INTRAMUSCULAR; INTRAVENOUS at 15:20

## 2022-12-11 RX ADMIN — WATER 20 ML: 1 INJECTION INTRAMUSCULAR; INTRAVENOUS; SUBCUTANEOUS at 15:20

## 2022-12-11 RX ADMIN — SODIUM CHLORIDE, PRESERVATIVE FREE 10 ML: 5 INJECTION INTRAVENOUS at 09:59

## 2022-12-11 NOTE — PROGRESS NOTES
Notified Dr. Bruno Ho that pt. Is ok to discharge from ID perspective and be late for dose of ancef tomorrow morning. Spoke with Mookie Philippe NP this morning.     Electronically signed by Jarred Wyman RN on 12/11/2022 at 3:49 PM

## 2022-12-11 NOTE — PROGRESS NOTES
[address] [place] [address] [facility]  Progress Note    SUBJECTIVE:  Chief Complaint   Patient presents with    Wrist Pain     Pain and swelling in right wrist.  Sent by [facility] for surgery tomorrow. Patient is tolerating medications. No reported adverse drug reactions. No nausea, vomiting, diarrhea. In bed, feeling well  She is hoping to go home today   No fevers    Review of systems:  As stated above in the chief complaint, otherwise negative. Medications:  Scheduled Meds:   ceFAZolin  2,000 mg IntraVENous Q8H    And    sterile water  20 mL Injection Q8H    sodium chloride flush  5-40 mL IntraVENous 2 times per day    heparin flush  3 mL IntraVENous 2 times per day    sodium chloride flush  5-40 mL IntraVENous 2 times per day    acetaminophen  650 mg Oral Q6H     Continuous Infusions:   sodium chloride      sodium chloride 75 mL/hr at 22 2250    sodium chloride       PRN Meds:sodium chloride flush, sodium chloride, heparin flush, sodium chloride flush, sodium chloride, oxyCODONE, HYDROmorphone **OR** HYDROmorphone    OBJECTIVE:  BP (!) 155/76   Pulse 61   Temp 97.9 °F (36.6 °C) (Oral)   Resp 16   Ht 5' 4\" (1.626 m)   Wt 128 lb (58.1 kg)   SpO2 96%   BMI 21.97 kg/m²   Temp  Av.1 °F (36.7 °C)  Min: 97.9 °F (36.6 °C)  Max: 98.4 °F (36.9 °C)  Constitutional: The patient is awake, alert, and oriented. In no distress. Sitting up in bed. Skin: Warm and dry. No rashes were noted. HEENT: Round and reactive pupils. Moist mucous membranes. No ulcerations or thrush. Neck: Supple to movements. Chest: No use of accessory muscles to breathe. Symmetrical expansion. No wheezing, crackles or rhonchi. Cardiovascular: S1 and S2 are rhythmic and regular. No murmurs appreciated. Abdomen: Positive bowel sounds to auscultation. Benign to palpation. No masses felt. Extremities: No edema. Right arm is in surgical dressing.    Lines: peripheral   L Basilic PICC 65/4/48 20PE Laboratory and Tests Review:  Lab Results   Component Value Date    WBC 8.7 12/09/2022    WBC 8.4 12/08/2022    WBC 9.3 12/07/2022    HGB 14.9 12/09/2022    HCT 45.2 12/09/2022    MCV 89.9 12/09/2022     12/09/2022     Lab Results   Component Value Date    NEUTROABS 3.87 12/06/2022     No results found for: CRP  Lab Results   Component Value Date    ALT 10 12/06/2022    AST 17 12/06/2022    ALKPHOS 125 (H) 12/06/2022    BILITOT 0.4 12/06/2022     Lab Results   Component Value Date/Time     12/09/2022 03:16 AM    K 4.8 12/09/2022 03:16 AM     12/09/2022 03:16 AM    CO2 25 12/09/2022 03:16 AM    BUN 16 12/09/2022 03:16 AM    CREATININE 0.7 12/09/2022 03:16 AM    CREATININE 0.8 12/08/2022 02:57 AM    CREATININE 0.9 12/07/2022 03:14 AM    LABGLOM >60 12/09/2022 03:16 AM    GLUCOSE 94 12/09/2022 03:16 AM    PROT 6.9 12/06/2022 02:13 AM    LABALBU 4.0 12/06/2022 02:13 AM    CALCIUM 9.0 12/09/2022 03:16 AM    BILITOT 0.4 12/06/2022 02:13 AM    ALKPHOS 125 12/06/2022 02:13 AM    AST 17 12/06/2022 02:13 AM    ALT 10 12/06/2022 02:13 AM     Lab Results   Component Value Date    CRP 0.3 12/06/2022     Lab Results   Component Value Date    SEDRATE 2 12/06/2022     Radiology:  Reviewed     Microbiology:   Blood cultures 12/6: negative so far  Wound culture 12/6: no organisms seen   Surgical Cultures 12/6/22: negative so far    ASSESSMENT:  Possibly infected versus hemorrhagic right wrist cyst  Status post I&D right forearm abscess 12/6/2022- purulence was noted     PLAN:  Continue Cefazolin for now  PICC line placed   Check final & intraoperative cultures- negative so far   Cultures are being held   Monitor labs - CRP 0.3, ESR 2, ASO 32  DC plan Home with [facility], they can start tomorrow - okay to DC from ID PONATHANIEL Mason, APRN - CNP  11:32 AM  12/11/2022     Pt seen and examined. Above discussed agree with advanced practice nurse. Labs, cultures, and radiographs reviewed.   Face to Face encounter occurred. Changes made as necessary.      Tisha Wood MD

## 2022-12-11 NOTE — DISCHARGE SUMMARY
[facility] Physician Discharge Summary     Joselin Werner, 80 y. o.female /  1929  / MRN [id number]    Admission date  2022  Admission diagnoses   R forearm abscess  Consults   ID, ortho, social work, home care  Procedures   See hospital course  Discharge date  2022  4:02 PM  Discharge diagnoses Same  Discharge condition  Stable    Discharge disposition Home  Activity level   As tolerated  Follow-up     SUNDAR Capps - NP  [address] [phone]    Follow up in 2 week(s)      Aliya Saldivar MD  [address] [place] [address] [address]  [phone]    Schedule an appointment as soon as possible for a visit in 2 week(s)  For outpatient follow up    [address] [facility]  [phone]  Follow up        [address] [place] [address] no known PMH presented to ED  after seeing Dr. Ryan Jose in the office  for erythema, pain, and drainage of what was thought to be a cyst on her R wrist.  ID consulted, started Ancef. S/p I&D on , purulent discharge which was cultured though never ended up showing any organisms. Over concern for organisms not covered by PO abx, pt had PICC placed and Ancef reconciled. Would need [facility] to get but they could not see pt til  so dc that was originally for  was delayed so she could get abx here in the interim. Now stable for dc home.     Discharge Exam  Vitals: BP (!) 140/72   Pulse 67   Temp 98 °F (36.7 °C) (Oral)   Resp 16   Ht 5' 4\" (1.626 m)   Wt 128 lb (58.1 kg)   SpO2 95%   BMI 21.97 kg/m²   General: well-developed, well-nourished, no acute distress, cooperative  Skin: warm, dry, and generally intact with normal color  HEENT: no gross abnormalities  Respiratory: clear to auscultation bilaterally without respiratory distress  Cardiovascular: regular rate and rhythm   Abdominal: soft, nontender, nondistended, positive bowel sounds  Extremities: no obvious edema or deformity  Neurologic: awake, alert, no gross deficits  Psychiatric: normal affect, cooperative       Medication List        START taking these medications      ceFAZolin  infusion  Commonly known as: ANCEF  Infuse 2,000 mg intravenously in the morning and 2,000 mg in the evening. Do all this for 21 days. Compound per protocol.             CONTINUE taking these medications      levothyroxine 50 MCG tablet  Commonly known as: SYNTHROID               Where to Get Your Medications        You can get these medications from any pharmacy    Bring a paper prescription for each of these medications  ceFAZolin  infusion       Note that more than 30 minutes was spent in preparing discharge papers, discussing discharge with patient, medication review, etc.    Electronically signed by Franco Guan DO on 12/11/2022 at 4:02 PM

## 2022-12-11 NOTE — PLAN OF CARE
Problem: ABCDS Injury Assessment  Goal: Absence of physical injury  12/11/2022 1102 by Leatha Sharpe RN  Outcome: Progressing  12/10/2022 2203 by Sheryle Bern, RN  Outcome: Progressing     Problem: Pain  Goal: Verbalizes/displays adequate comfort level or baseline comfort level  12/11/2022 1102 by Leatha Sharpe RN  Outcome: Progressing  12/10/2022 2203 by Sheryle Bern, RN  Outcome: Progressing     Problem: Discharge Planning  Goal: Discharge to home or other facility with appropriate resources  12/11/2022 1102 by Leatha Sharpe RN  Outcome: Progressing  12/10/2022 2203 by Sheryle Bern, RN  Outcome: Progressing

## 2022-12-11 NOTE — PROGRESS NOTES
Spoke with Anam Ortiz with [facility] - they have pt. On the schedule to see tomorrow morning. Spoke with on call pharmacist with [facility] - they can not get pt dose to her until 56 - notified Anam Ortiz of the same.     Electronically signed by Flavia Gibson RN on 12/11/2022 at 12:51 PM

## 2022-12-12 LAB — ANAEROBIC CULTURE: NORMAL

## (undated) DEVICE — 4-PORT MANIFOLD: Brand: NEPTUNE 2

## (undated) DEVICE — DOUBLE BASIN SET: Brand: MEDLINE INDUSTRIES, INC.

## (undated) DEVICE — Y-TYPE TUR/BLADDER IRRIGATION SET, REGULATING CLAMP

## (undated) DEVICE — GAUZE,SPONGE,4"X4",8PLY,STRL,LF,10/TRAY: Brand: MEDLINE

## (undated) DEVICE — SOLUTION IV IRRIG POUR BRL 0.9% SODIUM CHL 2F7124

## (undated) DEVICE — GLOVE ORANGE PI 7   MSG9070

## (undated) DEVICE — PADDING UNDERCAST W4INXL4YD COT FBR LO LINTING WYTEX

## (undated) DEVICE — COVER HNDL LT DISP

## (undated) DEVICE — SWAB CULTERETTE EZ POLYURE DUAL FOAM TIP

## (undated) DEVICE — BANDAGE,GAUZE,BULKEE II,4.5"X4.1YD,STRL: Brand: MEDLINE

## (undated) DEVICE — Device

## (undated) DEVICE — 6 X 9  1.75MIL 4-WALL LABGUARD: Brand: MINIGRIP COMMERCIAL LLC

## (undated) DEVICE — SYRINGE, LUER LOCK, 10ML: Brand: MEDLINE

## (undated) DEVICE — SPLINT ORTH W4XL15IN PLSTR OF PARIS LO EXOTHERM SMOOTH

## (undated) DEVICE — ELECTRODE PT RET AD L9FT HI MOIST COND ADH HYDRGEL CORDED

## (undated) DEVICE — ZIMMER® STERILE DISPOSABLE TOURNIQUET CUFF WITH PLC, DUAL PORT, SINGLE BLADDER, 18 IN. (46 CM)

## (undated) DEVICE — CONTAINER SPEC ANAERB VACTNR

## (undated) DEVICE — PADDING,UNDERCAST,COTTON, 4"X4YD STERILE: Brand: MEDLINE

## (undated) DEVICE — PAD,ABDOMINAL,5"X9",ST,LF,25/BX: Brand: MEDLINE INDUSTRIES, INC.

## (undated) DEVICE — PENCIL ES CRD L10FT HND SWCHING ROCK SWCH W/ EDGE COAT BLDE

## (undated) DEVICE — SURGICAL PROCEDURE PACK HND

## (undated) DEVICE — BNDG,ELSTC,MATRIX,STRL,4"X5YD,LF,HOOK&LP: Brand: MEDLINE